# Patient Record
Sex: FEMALE | Race: WHITE | NOT HISPANIC OR LATINO | Employment: OTHER | URBAN - METROPOLITAN AREA
[De-identification: names, ages, dates, MRNs, and addresses within clinical notes are randomized per-mention and may not be internally consistent; named-entity substitution may affect disease eponyms.]

---

## 2023-03-28 LAB
APPEARANCE, URINE: CLEAR
BILIRUBIN, URINE: NEGATIVE
BLOOD, URINE: NEGATIVE
COLOR, URINE: ABNORMAL
GLUCOSE, URINE: NEGATIVE MG/DL
KETONES, URINE: NEGATIVE MG/DL
LEUKOCYTE ESTERASE, URINE: ABNORMAL
MUCUS, URINE: NORMAL /LPF
NITRITE, URINE: NEGATIVE
PH, URINE: 6 (ref 5–8)
PROTEIN, URINE: NEGATIVE MG/DL
RBC, URINE: 1 /HPF (ref 0–5)
SPECIFIC GRAVITY, URINE: 1.01 (ref 1–1.03)
SQUAMOUS EPITHELIAL CELLS, URINE: <1 /HPF
UROBILINOGEN, URINE: <2 MG/DL (ref 0–1.9)
WBC, URINE: 4 /HPF (ref 0–5)

## 2023-03-29 LAB — URINE CULTURE: NORMAL

## 2023-04-09 LAB — URINE CULTURE: ABNORMAL

## 2023-04-18 LAB — URINE CULTURE: NORMAL

## 2023-04-21 PROBLEM — M54.2 NECK PAIN ON LEFT SIDE: Status: ACTIVE | Noted: 2023-04-21

## 2023-04-21 PROBLEM — K21.9 GERD (GASTROESOPHAGEAL REFLUX DISEASE): Status: ACTIVE | Noted: 2023-04-21

## 2023-04-21 PROBLEM — E55.9 VITAMIN D DEFICIENCY: Status: ACTIVE | Noted: 2023-04-21

## 2023-04-21 PROBLEM — N39.3 FEMALE STRESS INCONTINENCE: Status: ACTIVE | Noted: 2023-04-21

## 2023-04-21 PROBLEM — M17.12 PRIMARY OSTEOARTHRITIS OF LEFT KNEE: Status: ACTIVE | Noted: 2023-04-21

## 2023-04-21 PROBLEM — H35.039 HYPERTENSIVE RETINOPATHY: Status: ACTIVE | Noted: 2023-04-21

## 2023-04-21 PROBLEM — N95.1 MENOPAUSAL SYMPTOMS: Status: ACTIVE | Noted: 2023-04-21

## 2023-04-21 PROBLEM — E78.5 HYPERLIPIDEMIA: Status: ACTIVE | Noted: 2023-04-21

## 2023-04-21 PROBLEM — M25.562 LEFT KNEE PAIN: Status: ACTIVE | Noted: 2023-04-21

## 2023-04-21 PROBLEM — H34.8312: Status: ACTIVE | Noted: 2023-04-21

## 2023-04-21 PROBLEM — M79.18 MYOFASCIAL MUSCLE PAIN: Status: ACTIVE | Noted: 2023-04-21

## 2023-04-21 PROBLEM — N39.0 UTI (URINARY TRACT INFECTION): Status: ACTIVE | Noted: 2023-04-21

## 2023-04-21 PROBLEM — E03.9 HYPOTHYROIDISM: Status: ACTIVE | Noted: 2023-04-21

## 2023-04-21 PROBLEM — I10 HYPERTENSION: Status: ACTIVE | Noted: 2023-04-21

## 2023-04-21 PROBLEM — R51.9 HEADACHE: Status: ACTIVE | Noted: 2023-04-21

## 2023-04-21 PROBLEM — M85.80 OSTEOPENIA: Status: ACTIVE | Noted: 2023-04-21

## 2023-04-21 PROBLEM — J30.2 SEASONAL ALLERGIES: Status: ACTIVE | Noted: 2023-04-21

## 2023-04-21 PROBLEM — N81.9 VAGINAL VAULT PROLAPSE: Status: ACTIVE | Noted: 2023-04-21

## 2023-04-21 PROBLEM — R73.09 ELEVATED GLUCOSE: Status: ACTIVE | Noted: 2023-04-21

## 2023-04-21 PROBLEM — R53.83 FATIGUE: Status: ACTIVE | Noted: 2023-04-21

## 2023-04-21 RX ORDER — LEVOTHYROXINE SODIUM 75 UG/1
75 TABLET ORAL DAILY
COMMUNITY
Start: 2014-02-19 | End: 2023-08-10 | Stop reason: SDUPTHER

## 2023-04-21 RX ORDER — HYDROCHLOROTHIAZIDE 12.5 MG/1
12.5 TABLET ORAL DAILY
COMMUNITY
Start: 2023-02-22 | End: 2023-04-25 | Stop reason: ALTCHOICE

## 2023-04-21 RX ORDER — LISINOPRIL 20 MG/1
20 TABLET ORAL DAILY
COMMUNITY
Start: 2022-11-03 | End: 2023-08-25

## 2023-04-21 RX ORDER — ATORVASTATIN CALCIUM 10 MG/1
10 TABLET, FILM COATED ORAL NIGHTLY
COMMUNITY
Start: 2019-12-09 | End: 2024-02-20

## 2023-04-21 RX ORDER — CETIRIZINE HYDROCHLORIDE 10 MG/1
10 TABLET ORAL DAILY
COMMUNITY
Start: 2017-05-26

## 2023-04-21 RX ORDER — CHOLECALCIFEROL (VITAMIN D3) 25 MCG
3 TABLET ORAL DAILY
COMMUNITY

## 2023-04-21 RX ORDER — AMLODIPINE BESYLATE 5 MG/1
5 TABLET ORAL DAILY
COMMUNITY
Start: 2022-11-28 | End: 2023-07-20 | Stop reason: SINTOL

## 2023-04-21 RX ORDER — PANTOPRAZOLE SODIUM 40 MG/1
40 TABLET, DELAYED RELEASE ORAL DAILY
COMMUNITY
Start: 2021-05-25 | End: 2024-02-20

## 2023-04-25 ENCOUNTER — LAB (OUTPATIENT)
Dept: LAB | Facility: LAB | Age: 73
End: 2023-04-25
Payer: MEDICARE

## 2023-04-25 ENCOUNTER — OFFICE VISIT (OUTPATIENT)
Dept: PRIMARY CARE | Facility: CLINIC | Age: 73
End: 2023-04-25
Payer: MEDICARE

## 2023-04-25 VITALS
SYSTOLIC BLOOD PRESSURE: 128 MMHG | HEART RATE: 60 BPM | RESPIRATION RATE: 14 BRPM | WEIGHT: 155.7 LBS | OXYGEN SATURATION: 97 % | TEMPERATURE: 97 F | BODY MASS INDEX: 25.94 KG/M2 | DIASTOLIC BLOOD PRESSURE: 74 MMHG | HEIGHT: 65 IN

## 2023-04-25 DIAGNOSIS — E03.9 ACQUIRED HYPOTHYROIDISM: ICD-10-CM

## 2023-04-25 DIAGNOSIS — E78.2 MIXED HYPERLIPIDEMIA: ICD-10-CM

## 2023-04-25 DIAGNOSIS — I10 PRIMARY HYPERTENSION: ICD-10-CM

## 2023-04-25 DIAGNOSIS — H35.031 HYPERTENSIVE RETINOPATHY OF RIGHT EYE: ICD-10-CM

## 2023-04-25 DIAGNOSIS — R73.09 ELEVATED GLUCOSE: ICD-10-CM

## 2023-04-25 DIAGNOSIS — I10 PRIMARY HYPERTENSION: Primary | ICD-10-CM

## 2023-04-25 PROBLEM — N81.10 FEMALE BLADDER PROLAPSE: Status: ACTIVE | Noted: 2023-04-25

## 2023-04-25 PROBLEM — R30.9 URINARY PAIN: Status: ACTIVE | Noted: 2023-04-25

## 2023-04-25 LAB
ALANINE AMINOTRANSFERASE (SGPT) (U/L) IN SER/PLAS: 19 U/L (ref 7–45)
ALBUMIN (G/DL) IN SER/PLAS: 4.6 G/DL (ref 3.4–5)
ALKALINE PHOSPHATASE (U/L) IN SER/PLAS: 89 U/L (ref 33–136)
ANION GAP IN SER/PLAS: 14 MMOL/L (ref 10–20)
ASPARTATE AMINOTRANSFERASE (SGOT) (U/L) IN SER/PLAS: 20 U/L (ref 9–39)
BILIRUBIN TOTAL (MG/DL) IN SER/PLAS: 0.6 MG/DL (ref 0–1.2)
CALCIUM (MG/DL) IN SER/PLAS: 9.4 MG/DL (ref 8.6–10.6)
CARBON DIOXIDE, TOTAL (MMOL/L) IN SER/PLAS: 27 MMOL/L (ref 21–32)
CHLORIDE (MMOL/L) IN SER/PLAS: 106 MMOL/L (ref 98–107)
CHOLESTEROL (MG/DL) IN SER/PLAS: 161 MG/DL (ref 0–199)
CHOLESTEROL IN HDL (MG/DL) IN SER/PLAS: 58 MG/DL
CHOLESTEROL/HDL RATIO: 2.8
CREATININE (MG/DL) IN SER/PLAS: 0.97 MG/DL (ref 0.5–1.05)
ESTIMATED AVERAGE GLUCOSE FOR HBA1C: 117 MG/DL
GFR FEMALE: 62 ML/MIN/1.73M2
GLUCOSE (MG/DL) IN SER/PLAS: 107 MG/DL (ref 74–99)
HEMOGLOBIN A1C/HEMOGLOBIN TOTAL IN BLOOD: 5.7 %
LDL: 83 MG/DL (ref 0–99)
POTASSIUM (MMOL/L) IN SER/PLAS: 4.6 MMOL/L (ref 3.5–5.3)
PROTEIN TOTAL: 7 G/DL (ref 6.4–8.2)
SODIUM (MMOL/L) IN SER/PLAS: 142 MMOL/L (ref 136–145)
THYROTROPIN (MIU/L) IN SER/PLAS BY DETECTION LIMIT <= 0.05 MIU/L: 2.06 MIU/L (ref 0.44–3.98)
TRIGLYCERIDE (MG/DL) IN SER/PLAS: 102 MG/DL (ref 0–149)
UREA NITROGEN (MG/DL) IN SER/PLAS: 17 MG/DL (ref 6–23)
VLDL: 20 MG/DL (ref 0–40)

## 2023-04-25 PROCEDURE — 80061 LIPID PANEL: CPT

## 2023-04-25 PROCEDURE — 1160F RVW MEDS BY RX/DR IN RCRD: CPT | Performed by: FAMILY MEDICINE

## 2023-04-25 PROCEDURE — 83036 HEMOGLOBIN GLYCOSYLATED A1C: CPT

## 2023-04-25 PROCEDURE — 1036F TOBACCO NON-USER: CPT | Performed by: FAMILY MEDICINE

## 2023-04-25 PROCEDURE — 1159F MED LIST DOCD IN RCRD: CPT | Performed by: FAMILY MEDICINE

## 2023-04-25 PROCEDURE — 36415 COLL VENOUS BLD VENIPUNCTURE: CPT

## 2023-04-25 PROCEDURE — 80053 COMPREHEN METABOLIC PANEL: CPT

## 2023-04-25 PROCEDURE — 99214 OFFICE O/P EST MOD 30 MIN: CPT | Performed by: FAMILY MEDICINE

## 2023-04-25 PROCEDURE — 3078F DIAST BP <80 MM HG: CPT | Performed by: FAMILY MEDICINE

## 2023-04-25 PROCEDURE — 84443 ASSAY THYROID STIM HORMONE: CPT

## 2023-04-25 PROCEDURE — 3074F SYST BP LT 130 MM HG: CPT | Performed by: FAMILY MEDICINE

## 2023-04-25 RX ORDER — PHENAZOPYRIDINE HYDROCHLORIDE 200 MG/1
TABLET, FILM COATED ORAL
COMMUNITY
Start: 2023-04-17 | End: 2023-04-25 | Stop reason: ALTCHOICE

## 2023-04-25 RX ORDER — NITROFURANTOIN 25; 75 MG/1; MG/1
1 CAPSULE ORAL 2 TIMES DAILY
COMMUNITY
Start: 2023-04-10 | End: 2023-04-25 | Stop reason: ALTCHOICE

## 2023-04-25 ASSESSMENT — PAIN SCALES - GENERAL: PAINLEVEL: 0-NO PAIN

## 2023-04-25 NOTE — PROGRESS NOTES
"Subjective   Patient ID: Eula Ku is a 72 y.o. female who presents for Hypothyroidism and Hyperlipidemia.    HPI   Eula was seen today for routine follow-up of her hypertension, hyperlipidemia, hypothyroidism.  Medication(s) are being taken and tolerated as prescribed, without concerns, list reconciled today.  There are no complaints of chest pain, shortness of breath, lower extremity edema, or exertional concerns    She overall feels well, has no new concerns today.  She is about 7 weeks status post rectal prolapse/bladder surgery.  He is labs reviewed, fasting sugar was 106, A1c 5.8.  Since then, prior to her surgery in , she cut out candy and other sweets, walks 2 to 3 miles a day.  Weight was down, then she gained a few postoperatively.  Review of Systems  The full, 10+ multi-organ review of systems, is within normal limits with the exception of what is noted above in HPI.  Objective   /74 (BP Location: Right arm, Patient Position: Sitting, BP Cuff Size: Adult)   Pulse 60   Temp 36.1 °C (97 °F) (Temporal)   Resp 14   Ht 1.651 m (5' 5\")   Wt 70.6 kg (155 lb 11.2 oz)   LMP  (LMP Unknown)   SpO2 97%   BMI 25.91 kg/m²     Physical Exam  Constitutional/General appearance: alert, oriented, well-appearing, in no distress  Head and face exam is normal  No scleral icterus or conjunctival erythema present  Hearing is grossly normal  Respiratory effort is normal, no dyspnea noted  Cortical function is normal  Mood, affect, are pleasant, appropriate, and interactive.  Insight is normal  Cardiac exam reveals a regular rate and rhythm, subtle   Assessment/Plan     Hypertension--- since today's blood pressures are at goal, I have recommended continuing the current treatment regimen, including medication as noted above, as well as a low salt, low-fat, high-fiber diet.  Exercise is to be continued as able and tolerated.  We will continue to follow the high blood pressure on an every six-month basis, " and address additional needs should they arise.    Hyperlipidemia--- since lipid panels are/have been stable, I have recommended continuing the current regimen.  This includes a low fat/high-fiber diet, to include foods rich in natural Omega-3's, such as seafood, nuts, and olives, so long as allergies do not prohibit.  Exercise should be continued as able.  Refills were sent as needed.  We will continue followup on an every six-month basis, and will address further needs/issues should they arise.    Hypothyroidism, clinically stable-----laboratory studies will be followed, as ordered/discussed.  The current regimen will be continued, including medication as noted above.  Refills will be appropriately maintained, and I have recommended continuing follow-up on an every 6 month basis, or sooner should the need arise.  Activity as tolerated, and a healthy diet are encouraged.    History of mildly elevated glucose, we will check a fasting sugar, as well as A1c today.    GERD--- symptoms remain well controlled on the current therapy, which I have recommended be continued.  No worrisome features are currently present.  Reflux precautions are important, including following a low fat/spice/caffeine/alcohol diet, and minimizing NSAIDs and aspirin.  Weight maintenance/loss measures will also be helpful.    Routine f/u will be continued.      **Portions of this medical record have been created using voice recognition software and may have minor errors which are inherent in voice recognition systems. It has not been fully edited for typographical or grammatical errors**

## 2023-04-26 NOTE — RESULT ENCOUNTER NOTE
Labs are all reassuring and stable.  Sugar is a few pts above normal, chol, kidney, liver, sugar, thyroid levels are normal.  Continue current medications, keep routine follow up

## 2023-06-16 LAB — URINE CULTURE: ABNORMAL

## 2023-07-18 ENCOUNTER — TELEPHONE (OUTPATIENT)
Dept: PRIMARY CARE | Facility: CLINIC | Age: 73
End: 2023-07-18
Payer: MEDICARE

## 2023-07-18 NOTE — TELEPHONE ENCOUNTER
The patient is calling this afternoon with concerns to side effects to her BP medication.    The patient states she was told swelling would be a side effect, and while she does not use salt and walks religiously she has a lot of swelling in her ankles and legs.  The patient is asking if she needs a water pill or what would be suggested by the provider?

## 2023-07-20 DIAGNOSIS — I10 PRIMARY HYPERTENSION: Primary | ICD-10-CM

## 2023-07-20 RX ORDER — METOPROLOL SUCCINATE 50 MG/1
50 TABLET, EXTENDED RELEASE ORAL DAILY
Qty: 30 TABLET | Refills: 1 | Status: SHIPPED | OUTPATIENT
Start: 2023-07-20 | End: 2023-08-14

## 2023-07-20 RX ORDER — HYDROCHLOROTHIAZIDE 12.5 MG/1
12.5 TABLET ORAL DAILY
Qty: 90 TABLET | Refills: 1 | Status: SHIPPED | OUTPATIENT
Start: 2023-07-20 | End: 2023-07-20 | Stop reason: SINTOL

## 2023-07-20 NOTE — TELEPHONE ENCOUNTER
Yes, the swelling is very likely from her amlodipine.  Please stop it, side effects should improve over the next 2 weeks or so.  She will need something to replace the amlodipine, to keep her blood pressure at goal, so I did send a prescription for a mild diuretic, hydrochlorothiazide, once daily in the morning.  Track blood pressures at home if able.

## 2023-07-20 NOTE — TELEPHONE ENCOUNTER
Yes, now I recall.  I did put it on her allergy/sensitivity list now, so there is no question in the future.  Please cancel the hydrochlorothiazide at her pharmacy, I sent a prescription for metoprolol once daily

## 2023-08-10 DIAGNOSIS — E03.9 HYPOTHYROIDISM, UNSPECIFIED TYPE: ICD-10-CM

## 2023-08-11 RX ORDER — LEVOTHYROXINE SODIUM 75 UG/1
75 TABLET ORAL DAILY
Qty: 90 TABLET | Refills: 1 | Status: SHIPPED | OUTPATIENT
Start: 2023-08-11 | End: 2023-10-26 | Stop reason: ALTCHOICE

## 2023-08-13 DIAGNOSIS — I10 PRIMARY HYPERTENSION: ICD-10-CM

## 2023-08-14 RX ORDER — METOPROLOL SUCCINATE 50 MG/1
50 TABLET, EXTENDED RELEASE ORAL DAILY
Qty: 90 TABLET | Refills: 3 | Status: SHIPPED | OUTPATIENT
Start: 2023-08-14 | End: 2023-10-23

## 2023-08-25 ENCOUNTER — TELEPHONE (OUTPATIENT)
Dept: PRIMARY CARE | Facility: CLINIC | Age: 73
End: 2023-08-25

## 2023-08-25 DIAGNOSIS — I10 PRIMARY HYPERTENSION: Primary | ICD-10-CM

## 2023-08-25 RX ORDER — LISINOPRIL 40 MG/1
40 TABLET ORAL DAILY
Qty: 90 TABLET | Refills: 3 | Status: SHIPPED | OUTPATIENT
Start: 2023-08-25 | End: 2023-10-27 | Stop reason: SDUPTHER

## 2023-08-25 NOTE — TELEPHONE ENCOUNTER
Continue same metoprolol, increase lisinopril to 40 mg daily, new prescription sent.  She may use up her 20 mg tablets by taking 2 daily at the same time.

## 2023-10-20 DIAGNOSIS — I10 PRIMARY HYPERTENSION: ICD-10-CM

## 2023-10-20 NOTE — TELEPHONE ENCOUNTER
Pt calling said her Lisinopril was upped in Sept and she was doing good but the last 4 days her bp has been running 179/90 and work her up with headache , some visual disturbance, she get occ migraines.  10/19 bp 178/22  10/18 bp 179/89   10/17 bp 176/80  Pt has apt with you next Thursday,I advised her I spoke with you and you were having her take a extra Metoprolol today and 2 tabs daily till apt next week.  If worsening symptoms or no better needs to go to er.

## 2023-10-23 ENCOUNTER — OFFICE VISIT (OUTPATIENT)
Dept: PRIMARY CARE | Facility: CLINIC | Age: 73
End: 2023-10-23
Payer: MEDICARE

## 2023-10-23 VITALS
SYSTOLIC BLOOD PRESSURE: 170 MMHG | WEIGHT: 151.8 LBS | BODY MASS INDEX: 24.5 KG/M2 | HEART RATE: 52 BPM | RESPIRATION RATE: 14 BRPM | OXYGEN SATURATION: 98 % | DIASTOLIC BLOOD PRESSURE: 80 MMHG | TEMPERATURE: 97 F

## 2023-10-23 DIAGNOSIS — R51.9 NONINTRACTABLE HEADACHE, UNSPECIFIED CHRONICITY PATTERN, UNSPECIFIED HEADACHE TYPE: ICD-10-CM

## 2023-10-23 DIAGNOSIS — E03.9 HYPOTHYROIDISM, UNSPECIFIED TYPE: ICD-10-CM

## 2023-10-23 DIAGNOSIS — R73.09 ELEVATED GLUCOSE: ICD-10-CM

## 2023-10-23 DIAGNOSIS — I10 UNCONTROLLED STAGE 2 HYPERTENSION: Primary | ICD-10-CM

## 2023-10-23 DIAGNOSIS — I10 HYPERTENSION, UNSPECIFIED TYPE: ICD-10-CM

## 2023-10-23 PROCEDURE — 1160F RVW MEDS BY RX/DR IN RCRD: CPT | Performed by: FAMILY MEDICINE

## 2023-10-23 PROCEDURE — 1126F AMNT PAIN NOTED NONE PRSNT: CPT | Performed by: FAMILY MEDICINE

## 2023-10-23 PROCEDURE — 1036F TOBACCO NON-USER: CPT | Performed by: FAMILY MEDICINE

## 2023-10-23 PROCEDURE — 3079F DIAST BP 80-89 MM HG: CPT | Performed by: FAMILY MEDICINE

## 2023-10-23 PROCEDURE — 99214 OFFICE O/P EST MOD 30 MIN: CPT | Performed by: FAMILY MEDICINE

## 2023-10-23 PROCEDURE — 3077F SYST BP >= 140 MM HG: CPT | Performed by: FAMILY MEDICINE

## 2023-10-23 PROCEDURE — 1159F MED LIST DOCD IN RCRD: CPT | Performed by: FAMILY MEDICINE

## 2023-10-23 RX ORDER — AMLODIPINE BESYLATE 10 MG/1
10 TABLET ORAL DAILY
Qty: 30 TABLET | Refills: 0 | Status: SHIPPED | OUTPATIENT
Start: 2023-10-23 | End: 2023-11-02 | Stop reason: SDUPTHER

## 2023-10-23 NOTE — PROGRESS NOTES
Subjective   Patient ID: Eula Ku is a 73 y.o. female who presents for Hospital Follow-up (Pt presents for ED follow up HTN- pt states that she is not feeling well, states that she was up all night, gives her a headache, not getting any better at all. Pt has been keeping track of her bp).    HPI     Patient of Juan Antonio Mclain MD    Patient here for follow-up HTN s/p ED evaluation.    Patient presented to [facility] ED on 10/20/2023 with headaches and high blood pressure.     ED work-up:  -EKG shows rate of 54, QTc 421, no STEMI or acute ischemic change   -CT interpretation showed no acute intracranial abnormality  -Troponins negative  -No JUSTO  -No endorgan damage from hypertension  -Provided the patient with headache cocktail.   -BP much improved 130/72 after migraine cocktail.   -Patient discharged from ED to follow-up with PCP.    Differential Diagnoses per ED Note  -Hypertensive emergency  -High blood pressure  -Subarachnoid hemorrhage,   -ACS   -Other acute sequela of hypertension    Today, 10/23/2023, patient reports that she is not feeling well.   She was up all night, having headaches.  Can tell when pressures are up because she is symptomatic.  She states she is not having migraines, which she does have history of, feels these area different type of headaches that come as a result of uncontrolled pressures.    BP is 170/80 upon rooming in office today.    She brought home BP log into today.    Historically, this reveals that the Amlodipine controlled her pressures well but this caused edema. No rash or pain associated with the swelling, just noticeable. This was discontinued in August 2023 due to the edema and her Lisinopril was increased.    She notes that her readings from after increasing the Lisinopril do not indicate this increase was effective prior to having these significantly elevated pressures.     She is having symptomatic hypertension since these recent medications, reports headaches and  "states \"can just feel pressures\".    Denies any new supplements, dietary changes, or other medications changes other than the Amlodipine.    Current regimen includes:  Metoprolol 50 mg daily  Lisinopril 40 mg daily    Prior medications:  Amlodipine, unable to tolerate due to BLE edema.  HCTZ, unable to tolerate due to hyponatremia.    Review of Systems   All other systems reviewed and are negative.      Objective   /80 (BP Location: Right arm, Patient Position: Sitting, BP Cuff Size: Small adult)   Pulse 52   Temp 36.1 °C (97 °F) (Temporal)   Resp 14   Wt 68.9 kg (151 lb 12.8 oz)   LMP  (LMP Unknown)   SpO2 98%   BMI 24.50 kg/m²     Physical Exam  Vitals and nursing note reviewed.   Constitutional:       General: She is not in acute distress.     Appearance: Normal appearance. She is not toxic-appearing.   HENT:      Head: Normocephalic and atraumatic.   Eyes:      Extraocular Movements: Extraocular movements intact.      Pupils: Pupils are equal, round, and reactive to light.   Neck:      Thyroid: No thyromegaly.      Vascular: No carotid bruit, hepatojugular reflux or JVD.   Cardiovascular:      Rate and Rhythm: Regular rhythm. Bradycardia present.      Heart sounds: No murmur heard.     No friction rub. No gallop.   Pulmonary:      Effort: Pulmonary effort is normal.      Breath sounds: Normal breath sounds. No wheezing, rhonchi or rales.   Musculoskeletal:      Right lower leg: Edema (trace) present.      Left lower leg: Edema (trace) present.   Lymphadenopathy:      Cervical: No cervical adenopathy.   Neurological:      General: No focal deficit present.      Mental Status: She is alert and oriented to person, place, and time.   Psychiatric:         Mood and Affect: Mood normal.         Behavior: Behavior normal.       Assessment/Plan   Diagnoses and all orders for this visit:  Uncontrolled stage 2 hypertension  Hypertension, unspecified type  Nonintractable headache, unspecified chronicity " pattern, unspecified headache type  Hypothyroidism, unspecified type  Elevated glucose    ED records reviewed and discussed with patient, see HPI for summary.  -EKG shows rate of 54, QTc 421, no STEMI or acute ischemic change   -CT interpretation showed no acute intracranial abnormality  -Troponins negative  -No JUSTO  -No endorgan damage from hypertension  -BP much improved 130/72 after migraine cocktail.     Patient continues with symptomatic uncontrolled hypertension outpatient. BP home log reviewed with patient today, see medication changes below.    #1 Discontinue Metoprolol 50 mg  BP diary reveals this is ineffective and patients HR in 50s at low dose.    HCTZ contraindicated secondary to hyponatremia.  Amlodipine previously effective but did have edema with this.    We discussed resuming the Amlodipine vs trial a second or third line agent. Since patient symptomatic, BP significantly elevated, and this was effective in past (per patient report and BP diary) patient elects to resume the Amlodipine and revisit other options once BP is under better control. Her primary concern at this time is severity in elevation and associated headaches which impact her sleep, cognition, etc.    #2 Resume Amlodipine 10 mg daily  Patient to watch for edema.  Compression stockings (the kind that goes up to your knees) recommended. I recommend 10-15 or 15-20 mmHg stockings, which can be purchased on [employer].    #3 Continue Lisinopril 40 mg daily.    If patient wishes to try a natural approach to lowering blood pressure, can consider, patient advised these will not bring down to goal from current pressures but can help in addition to antihypertensives:  -whey protein 20 -30 g daily (hydrolyzed is best, but any is ok)  -aged garlic 600 mg twice daily ([employer] brand aged garlic on line is one example)   -CoQ10 supplement at 120 mg - 360 mg daily (average dose studied 225mg daily).     Patient to follow-up with PCP for further discussion  of BP meds, they can further explore other possible options at that time.    Repeat BMP, A1c (hx of hyperglycemia), and TSH w/ reflex (hx of hypothyroidism) ordered today, she should have these done prior to her follow-up with Dr. Mclain.      Follow-up with Dr. Mclain as previously scheduled for routine care.  Call for sooner follow-up if needed.     Scribe Attestation  By signing my name below, IAye Scribe   attest that this documentation has been prepared under the direction and in the presence of Aline Arndt DO.

## 2023-10-24 ENCOUNTER — LAB (OUTPATIENT)
Dept: LAB | Facility: LAB | Age: 73
End: 2023-10-24
Payer: MEDICARE

## 2023-10-24 DIAGNOSIS — E78.2 MIXED HYPERLIPIDEMIA: ICD-10-CM

## 2023-10-24 DIAGNOSIS — E03.9 ACQUIRED HYPOTHYROIDISM: ICD-10-CM

## 2023-10-24 DIAGNOSIS — E03.9 HYPOTHYROIDISM, UNSPECIFIED TYPE: ICD-10-CM

## 2023-10-24 DIAGNOSIS — I10 PRIMARY HYPERTENSION: ICD-10-CM

## 2023-10-24 DIAGNOSIS — I10 HYPERTENSION, UNSPECIFIED TYPE: ICD-10-CM

## 2023-10-24 DIAGNOSIS — E87.1 HYPONATREMIA: ICD-10-CM

## 2023-10-24 DIAGNOSIS — H35.031 HYPERTENSIVE RETINOPATHY OF RIGHT EYE: ICD-10-CM

## 2023-10-24 DIAGNOSIS — R73.09 ELEVATED GLUCOSE: ICD-10-CM

## 2023-10-24 LAB
ANION GAP SERPL CALC-SCNC: 13 MMOL/L
BUN SERPL-MCNC: 12 MG/DL (ref 6–23)
CALCIUM SERPL-MCNC: 9.7 MG/DL (ref 8.6–10.6)
CHLORIDE SERPL-SCNC: 93 MMOL/L (ref 98–107)
CO2 SERPL-SCNC: 27 MMOL/L (ref 21–32)
CREAT SERPL-MCNC: 0.94 MG/DL (ref 0.5–1.05)
EST. AVERAGE GLUCOSE BLD GHB EST-MCNC: 114 MG/DL
GFR SERPL CREATININE-BSD FRML MDRD: 65 ML/MIN/1.73M*2
GLUCOSE SERPL-MCNC: 117 MG/DL (ref 74–99)
HBA1C MFR BLD: 5.6 %
POTASSIUM SERPL-SCNC: 4.2 MMOL/L (ref 3.5–5.3)
SODIUM SERPL-SCNC: 129 MMOL/L (ref 136–145)
T4 FREE SERPL-MCNC: 1.32 NG/DL (ref 0.78–1.48)
TSH SERPL-ACNC: 9.39 MIU/L (ref 0.44–3.98)

## 2023-10-24 PROCEDURE — 84443 ASSAY THYROID STIM HORMONE: CPT

## 2023-10-24 PROCEDURE — 83036 HEMOGLOBIN GLYCOSYLATED A1C: CPT

## 2023-10-24 PROCEDURE — 83930 ASSAY OF BLOOD OSMOLALITY: CPT

## 2023-10-24 PROCEDURE — 36415 COLL VENOUS BLD VENIPUNCTURE: CPT

## 2023-10-24 PROCEDURE — 80048 BASIC METABOLIC PNL TOTAL CA: CPT

## 2023-10-24 PROCEDURE — 84439 ASSAY OF FREE THYROXINE: CPT

## 2023-10-26 ENCOUNTER — OFFICE VISIT (OUTPATIENT)
Dept: PRIMARY CARE | Facility: CLINIC | Age: 73
End: 2023-10-26
Payer: MEDICARE

## 2023-10-26 ENCOUNTER — LAB (OUTPATIENT)
Dept: LAB | Facility: LAB | Age: 73
End: 2023-10-26
Payer: MEDICARE

## 2023-10-26 VITALS
RESPIRATION RATE: 16 BRPM | BODY MASS INDEX: 24 KG/M2 | OXYGEN SATURATION: 98 % | TEMPERATURE: 96.8 F | HEART RATE: 84 BPM | WEIGHT: 148.7 LBS | SYSTOLIC BLOOD PRESSURE: 103 MMHG | DIASTOLIC BLOOD PRESSURE: 68 MMHG

## 2023-10-26 DIAGNOSIS — Z00.00 MEDICARE ANNUAL WELLNESS VISIT, SUBSEQUENT: ICD-10-CM

## 2023-10-26 DIAGNOSIS — E87.1 HYPONATREMIA: ICD-10-CM

## 2023-10-26 DIAGNOSIS — E78.2 MIXED HYPERLIPIDEMIA: ICD-10-CM

## 2023-10-26 DIAGNOSIS — H35.031 HYPERTENSIVE RETINOPATHY OF RIGHT EYE: ICD-10-CM

## 2023-10-26 DIAGNOSIS — E03.9 ACQUIRED HYPOTHYROIDISM: ICD-10-CM

## 2023-10-26 DIAGNOSIS — I10 PRIMARY HYPERTENSION: ICD-10-CM

## 2023-10-26 DIAGNOSIS — E87.1 HYPONATREMIA: Primary | ICD-10-CM

## 2023-10-26 PROBLEM — H25.13 AGE-RELATED NUCLEAR CATARACT OF BOTH EYES: Status: ACTIVE | Noted: 2022-05-13

## 2023-10-26 PROBLEM — H34.8310 TRIBUTARY (BRANCH) RETINAL VEIN OCCLUSION, RIGHT EYE, WITH MACULAR EDEMA (CMS-HCC): Status: ACTIVE | Noted: 2022-05-13

## 2023-10-26 PROBLEM — D25.9 LEIOMYOMA OF UTERUS, UNSPECIFIED: Status: ACTIVE | Noted: 2023-10-26

## 2023-10-26 PROBLEM — N81.6 RECTOCELE: Status: ACTIVE | Noted: 2023-10-26

## 2023-10-26 PROBLEM — R35.0 URINARY FREQUENCY: Status: ACTIVE | Noted: 2023-10-26

## 2023-10-26 PROBLEM — Z90.710 H/O HYSTERECTOMY FOR BENIGN DISEASE: Status: ACTIVE | Noted: 2023-10-26

## 2023-10-26 PROBLEM — G89.29 CHRONIC BILATERAL LOW BACK PAIN WITH RIGHT-SIDED SCIATICA: Status: ACTIVE | Noted: 2017-03-31

## 2023-10-26 PROBLEM — H81.09 MENIERE DISEASE: Status: ACTIVE | Noted: 2023-10-26

## 2023-10-26 PROBLEM — M54.41 CHRONIC BILATERAL LOW BACK PAIN WITH RIGHT-SIDED SCIATICA: Status: ACTIVE | Noted: 2017-03-31

## 2023-10-26 PROBLEM — N92.0 EXCESSIVE OR FREQUENT MENSTRUATION: Status: ACTIVE | Noted: 2023-10-26

## 2023-10-26 PROBLEM — H04.123 DRY EYE SYNDROME OF BOTH EYES: Status: ACTIVE | Noted: 2022-05-13

## 2023-10-26 PROBLEM — M12.9 ARTHROPATHY: Status: ACTIVE | Noted: 2023-10-26

## 2023-10-26 PROBLEM — K22.70 BARRETT'S ESOPHAGUS WITHOUT DYSPLASIA: Status: ACTIVE | Noted: 2021-07-11

## 2023-10-26 PROBLEM — H52.7 REFRACTIVE ERROR: Status: ACTIVE | Noted: 2022-05-13

## 2023-10-26 LAB — OSMOLALITY SERPL: 273 MOSM/KG (ref 280–300)

## 2023-10-26 PROCEDURE — 1126F AMNT PAIN NOTED NONE PRSNT: CPT | Performed by: FAMILY MEDICINE

## 2023-10-26 PROCEDURE — 3078F DIAST BP <80 MM HG: CPT | Performed by: FAMILY MEDICINE

## 2023-10-26 PROCEDURE — 83935 ASSAY OF URINE OSMOLALITY: CPT

## 2023-10-26 PROCEDURE — G0439 PPPS, SUBSEQ VISIT: HCPCS | Performed by: FAMILY MEDICINE

## 2023-10-26 PROCEDURE — 3074F SYST BP LT 130 MM HG: CPT | Performed by: FAMILY MEDICINE

## 2023-10-26 PROCEDURE — 1160F RVW MEDS BY RX/DR IN RCRD: CPT | Performed by: FAMILY MEDICINE

## 2023-10-26 PROCEDURE — 1170F FXNL STATUS ASSESSED: CPT | Performed by: FAMILY MEDICINE

## 2023-10-26 PROCEDURE — 82570 ASSAY OF URINE CREATININE: CPT

## 2023-10-26 PROCEDURE — 99214 OFFICE O/P EST MOD 30 MIN: CPT | Performed by: FAMILY MEDICINE

## 2023-10-26 PROCEDURE — 1036F TOBACCO NON-USER: CPT | Performed by: FAMILY MEDICINE

## 2023-10-26 PROCEDURE — 1159F MED LIST DOCD IN RCRD: CPT | Performed by: FAMILY MEDICINE

## 2023-10-26 PROCEDURE — 84300 ASSAY OF URINE SODIUM: CPT

## 2023-10-26 RX ORDER — LEVOTHYROXINE SODIUM 88 UG/1
88 TABLET ORAL DAILY
Qty: 90 TABLET | Refills: 3 | Status: SHIPPED | OUTPATIENT
Start: 2023-10-26 | End: 2024-05-23 | Stop reason: SDUPTHER

## 2023-10-26 ASSESSMENT — ACTIVITIES OF DAILY LIVING (ADL)
DRESSING: INDEPENDENT
MANAGING_FINANCES: INDEPENDENT
GROCERY_SHOPPING: INDEPENDENT
DOING_HOUSEWORK: INDEPENDENT
BATHING: INDEPENDENT
TAKING_MEDICATION: INDEPENDENT

## 2023-10-26 ASSESSMENT — PAIN SCALES - GENERAL: PAINLEVEL: 0-NO PAIN

## 2023-10-26 ASSESSMENT — PATIENT HEALTH QUESTIONNAIRE - PHQ9
2. FEELING DOWN, DEPRESSED OR HOPELESS: NOT AT ALL
SUM OF ALL RESPONSES TO PHQ9 QUESTIONS 1 AND 2: 0
1. LITTLE INTEREST OR PLEASURE IN DOING THINGS: NOT AT ALL

## 2023-10-26 NOTE — PROGRESS NOTES
Subjective   Reason for Visit: Eula Ku is an 73 y.o. female here for a Medicare Wellness visit.          Reviewed all medications by prescribing practitioner or clinical pharmacist (such as prescriptions, OTCs, herbal therapies and supplements) and documented in the medical record.    HPI    Patient Care Team:  Juan Antonio Mclain MD as PCP - General  Juan Antonio Mclain MD as PCP - [employer] PCP     Review of Systems    Objective   Vitals:  LMP  (LMP Unknown)       Physical Exam    Assessment/Plan   Problem List Items Addressed This Visit       Hyperlipidemia    Hypertension    Hypertensive retinopathy    Hypothyroidism

## 2023-10-26 NOTE — PROGRESS NOTES
Subjective   Patient ID: Eula Ku is a 73 y.o. female who presents for Medicare Annual Wellness Visit Subsequent.    [facility]   Eula was seen today for a routine follow-up of her medications, as well as an annual Medicare wellness review.  She overall feels fairly well now, has struggled a bit in recent weeks with fairly elevated blood pressures, at one point requiring an emergency room visit.  At her last visit her Norvasc was discontinued because of bilateral lower extremity edema, replaced with metoprolol.  Blood pressures increased to the 140s, 150s, at x160s systolic.  Recent labs have been reassuring, she requested to go back on Norvasc, continue lisinopril, at least knowing that it worked well.  She is otherwise remotely been on hydrochlorothiazide, though had hyponatremia.  She is taking and tolerating her other medications well.  There are no complaints of chest pain, shortness of breath, lower extremity edema, or exertional concerns  Mammogram, colonoscopy, bone density year sufficiently up-to-date.  She has had a flu vaccine.  Pneumococcal and Shingrix vaccine series complete.    Review of Systems  The full, 10+ multi-organ review of systems, is within normal limits with the exception of what is noted above in HPI.  Objective   /68 (BP Location: Right arm, Patient Position: Sitting, BP Cuff Size: Adult)   Pulse 84   Temp 36 °C (96.8 °F) (Temporal)   Resp 16   Wt 67.4 kg (148 lb 11.2 oz)   LMP  (LMP Unknown)   SpO2 98%   BMI 24.00 kg/m²     Physical Exam  Constitutional/General appearance: alert, oriented, well-appearing, in no distress  Head and face exam is normal  No scleral icterus or conjunctival erythema present  Hearing is grossly normal  Respiratory effort is normal, no dyspnea noted  Cortical function is normal  Mood, affect, are pleasant, appropriate, and interactive.  Insight is normal  Cardiac exam reveals a regular rate and rhythm, no murmurs, rubs or gallops present.    Lungs are clear bilaterally.    No lower extremity edema present.    Assessment/Plan     Blood pressure seems to have normalized, continue current regimen.  Watch for edema from amlodipine, which could recur.  Check labs as ordered.  She does have a history of intermittent hyponatremia, we will also add urine studies.  Hypothyroidism, adjust Synthroid as discussed.  Reflux stable with pantoprazole  See HPI for Medicare gaps.    Follow-up in 6 months  **Portions of this medical record have been created using voice recognition software and may have minor errors which are inherent in voice recognition systems. It has not been fully edited for typographical or grammatical errors**

## 2023-10-27 LAB
CREAT UR-MCNC: 134.5 MG/DL (ref 20–320)
OSMOLALITY UR: 517 MOSM/KG (ref 200–1200)
SODIUM UR-SCNC: 12 MMOL/L
SODIUM/CREAT UR-RTO: 9 MMOL/G CREAT

## 2023-10-27 RX ORDER — LISINOPRIL 40 MG/1
40 TABLET ORAL DAILY
Qty: 90 TABLET | Refills: 3 | Status: SHIPPED | OUTPATIENT
Start: 2023-10-27 | End: 2024-05-23 | Stop reason: SDUPTHER

## 2023-11-02 DIAGNOSIS — I10 UNCONTROLLED STAGE 2 HYPERTENSION: ICD-10-CM

## 2023-11-02 RX ORDER — AMLODIPINE BESYLATE 10 MG/1
10 TABLET ORAL DAILY
Qty: 90 TABLET | Refills: 3 | Status: SHIPPED | OUTPATIENT
Start: 2023-11-02 | End: 2024-05-08 | Stop reason: SINTOL

## 2023-11-03 ENCOUNTER — TELEPHONE (OUTPATIENT)
Dept: PRIMARY CARE | Facility: CLINIC | Age: 73
End: 2023-11-03

## 2023-11-03 DIAGNOSIS — E28.39 ESTROGEN DEFICIENCY: Primary | ICD-10-CM

## 2023-11-03 DIAGNOSIS — Z12.31 ENCOUNTER FOR SCREENING MAMMOGRAM FOR MALIGNANT NEOPLASM OF BREAST: ICD-10-CM

## 2023-11-03 NOTE — TELEPHONE ENCOUNTER
Pt called for orders . Pt was seen on 10/26/23. She needs a mammogram and bone density order. Pt gets these done at .

## 2023-11-13 ENCOUNTER — APPOINTMENT (OUTPATIENT)
Dept: OBSTETRICS AND GYNECOLOGY | Facility: CLINIC | Age: 73
End: 2023-11-13
Payer: MEDICARE

## 2024-01-25 ENCOUNTER — APPOINTMENT (OUTPATIENT)
Dept: RADIOLOGY | Facility: CLINIC | Age: 74
End: 2024-01-25
Payer: MEDICARE

## 2024-01-25 ENCOUNTER — HOSPITAL ENCOUNTER (OUTPATIENT)
Dept: RADIOLOGY | Facility: CLINIC | Age: 74
Discharge: HOME | End: 2024-01-25
Payer: MEDICARE

## 2024-01-25 VITALS — WEIGHT: 148.59 LBS | HEIGHT: 66 IN | BODY MASS INDEX: 23.88 KG/M2

## 2024-01-25 DIAGNOSIS — Z12.31 ENCOUNTER FOR SCREENING MAMMOGRAM FOR MALIGNANT NEOPLASM OF BREAST: ICD-10-CM

## 2024-01-25 PROCEDURE — 77067 SCR MAMMO BI INCL CAD: CPT | Performed by: RADIOLOGY

## 2024-01-25 PROCEDURE — 77063 BREAST TOMOSYNTHESIS BI: CPT | Performed by: RADIOLOGY

## 2024-01-25 PROCEDURE — 77067 SCR MAMMO BI INCL CAD: CPT

## 2024-02-08 ENCOUNTER — HOSPITAL ENCOUNTER (OUTPATIENT)
Dept: RADIOLOGY | Facility: EXTERNAL LOCATION | Age: 74
Discharge: HOME | End: 2024-02-08

## 2024-02-20 DIAGNOSIS — E78.2 MIXED HYPERLIPIDEMIA: Primary | ICD-10-CM

## 2024-02-20 DIAGNOSIS — K21.9 GASTROESOPHAGEAL REFLUX DISEASE WITHOUT ESOPHAGITIS: ICD-10-CM

## 2024-02-20 RX ORDER — ATORVASTATIN CALCIUM 10 MG/1
TABLET, FILM COATED ORAL
Qty: 90 TABLET | Refills: 3 | Status: SHIPPED | OUTPATIENT
Start: 2024-02-20 | End: 2024-03-11

## 2024-02-20 RX ORDER — PANTOPRAZOLE SODIUM 40 MG/1
TABLET, DELAYED RELEASE ORAL
Qty: 90 TABLET | Refills: 3 | Status: SHIPPED | OUTPATIENT
Start: 2024-02-20 | End: 2024-03-08

## 2024-03-01 ENCOUNTER — TELEPHONE (OUTPATIENT)
Dept: PRIMARY CARE | Facility: CLINIC | Age: 74
End: 2024-03-01
Payer: MEDICARE

## 2024-03-01 DIAGNOSIS — N95.1 MENOPAUSAL VAGINAL DRYNESS: Primary | ICD-10-CM

## 2024-03-01 NOTE — TELEPHONE ENCOUNTER
Patient called wanting a refill on her estrogens, conjugated, (Premarin) vaginal cream . It does not look like this is a med that you fill.  [facility]     Please advise.

## 2024-03-07 DIAGNOSIS — K21.9 GASTROESOPHAGEAL REFLUX DISEASE WITHOUT ESOPHAGITIS: ICD-10-CM

## 2024-03-08 RX ORDER — PANTOPRAZOLE SODIUM 40 MG/1
40 TABLET, DELAYED RELEASE ORAL
Qty: 90 TABLET | Refills: 1 | Status: SHIPPED | OUTPATIENT
Start: 2024-03-08 | End: 2024-05-23 | Stop reason: SDUPTHER

## 2024-03-09 DIAGNOSIS — E78.2 MIXED HYPERLIPIDEMIA: ICD-10-CM

## 2024-03-11 RX ORDER — ATORVASTATIN CALCIUM 10 MG/1
10 TABLET, FILM COATED ORAL DAILY
Qty: 90 TABLET | Refills: 3 | Status: SHIPPED | OUTPATIENT
Start: 2024-03-11 | End: 2024-05-23 | Stop reason: SDUPTHER

## 2024-03-18 ENCOUNTER — APPOINTMENT (OUTPATIENT)
Dept: RADIOLOGY | Facility: CLINIC | Age: 74
End: 2024-03-18
Payer: MEDICARE

## 2024-04-01 ENCOUNTER — APPOINTMENT (OUTPATIENT)
Dept: OBSTETRICS AND GYNECOLOGY | Facility: CLINIC | Age: 74
End: 2024-04-01
Payer: MEDICARE

## 2024-05-02 ENCOUNTER — HOSPITAL ENCOUNTER (OUTPATIENT)
Dept: RADIOLOGY | Facility: CLINIC | Age: 74
Discharge: HOME | End: 2024-05-02
Payer: MEDICARE

## 2024-05-02 DIAGNOSIS — E28.39 ESTROGEN DEFICIENCY: ICD-10-CM

## 2024-05-02 PROCEDURE — 77080 DXA BONE DENSITY AXIAL: CPT

## 2024-05-03 ENCOUNTER — APPOINTMENT (OUTPATIENT)
Dept: PRIMARY CARE | Facility: CLINIC | Age: 74
End: 2024-05-03
Payer: MEDICARE

## 2024-05-07 NOTE — RESULT ENCOUNTER NOTE
Bone density testing shows osteoporosis in the left hip, very mild thinning in the low back.  Recommend a discussion of medication to strengthen bones.    Need to continue calcium, vitamin D, weightbearing exercise.

## 2024-05-08 ENCOUNTER — OFFICE VISIT (OUTPATIENT)
Dept: PRIMARY CARE | Facility: CLINIC | Age: 74
End: 2024-05-08
Payer: MEDICARE

## 2024-05-08 VITALS
BODY MASS INDEX: 24.74 KG/M2 | DIASTOLIC BLOOD PRESSURE: 65 MMHG | WEIGHT: 153.2 LBS | HEART RATE: 73 BPM | TEMPERATURE: 98.3 F | SYSTOLIC BLOOD PRESSURE: 100 MMHG | OXYGEN SATURATION: 95 %

## 2024-05-08 DIAGNOSIS — I10 PRIMARY HYPERTENSION: Primary | ICD-10-CM

## 2024-05-08 DIAGNOSIS — R73.9 ELEVATED BLOOD SUGAR: ICD-10-CM

## 2024-05-08 DIAGNOSIS — E78.2 MIXED HYPERLIPIDEMIA: ICD-10-CM

## 2024-05-08 DIAGNOSIS — M81.0 AGE-RELATED OSTEOPOROSIS WITHOUT CURRENT PATHOLOGICAL FRACTURE: ICD-10-CM

## 2024-05-08 PROCEDURE — 99214 OFFICE O/P EST MOD 30 MIN: CPT | Performed by: FAMILY MEDICINE

## 2024-05-08 PROCEDURE — 3078F DIAST BP <80 MM HG: CPT | Performed by: FAMILY MEDICINE

## 2024-05-08 PROCEDURE — 3074F SYST BP LT 130 MM HG: CPT | Performed by: FAMILY MEDICINE

## 2024-05-08 PROCEDURE — 1036F TOBACCO NON-USER: CPT | Performed by: FAMILY MEDICINE

## 2024-05-08 PROCEDURE — 1159F MED LIST DOCD IN RCRD: CPT | Performed by: FAMILY MEDICINE

## 2024-05-08 RX ORDER — IBANDRONATE SODIUM 150 MG/1
150 TABLET, FILM COATED ORAL
Qty: 3 TABLET | Refills: 3 | Status: SHIPPED | OUTPATIENT
Start: 2024-05-08 | End: 2024-05-23 | Stop reason: SDUPTHER

## 2024-05-08 RX ORDER — TERAZOSIN 1 MG/1
1 CAPSULE ORAL NIGHTLY
Qty: 90 CAPSULE | Refills: 3 | Status: SHIPPED | OUTPATIENT
Start: 2024-05-08 | End: 2024-05-23 | Stop reason: SDUPTHER

## 2024-05-08 NOTE — PROGRESS NOTES
Subjective   Patient ID: Eula Ku is a 73 y.o. female who presents for Follow-up (6 month follow up for htn,chol, thyroid. Pt states she has no new issues to discuss today. ).    HPI   Eula was seen today for a routine follow-up of her hypertension, hyperlipidemia, hypothyroidism medications, as well as a review of her recent bone density testing which showed osteopenia in her lumbar spine, T-score of -2.6 in her left femoral neck.  Caffeine = 3/week  Alcohol = none  No tobacco  Calcium = 1200 mg daily  Vitamin D = 3000 units daily  She walks regularly from a weightbearing exercise standpoint    Home BP's 110-116/75.  However, she has had recurrence of lower extremity edema from amlodipine 10 mg.  She tried decreasing to 5 mg daily, blood pressures increased to the 140s.  Eula continues to take and tolerate lisinopril as prescribed.  Pantoprazole effectively treats her reflux symptoms.  She is due for fasting labs, had a piece of toast this morning  Review of Systems  The full, 10+ multi-organ review of systems, is within normal limits with the exception of what is noted above in HPI.  Objective   /65 (BP Location: Right arm, Patient Position: Sitting, BP Cuff Size: Adult)   Pulse 73   Temp 36.8 °C (98.3 °F) (Temporal)   Wt 69.5 kg (153 lb 3.2 oz)   LMP  (LMP Unknown)   SpO2 95%   BMI 24.74 kg/m²     Physical Exam  Constitutional/General appearance: alert, oriented, well-appearing, in no distress  Head and face exam is normal  No scleral icterus or conjunctival erythema present  Hearing is grossly normal  Respiratory effort is normal, no dyspnea noted  Cortical function is normal  Mood, affect, are pleasant, appropriate, and interactive.  Insight is normal  Cardiac exam reveals a regular rate and rhythm, no murmurs, rubs or gallops present.   Lungs are clear bilaterally.    No lower extremity edema present.    Assessment/Plan     Hyperlipidemia--- since lipid panels are/have been stable, I  have recommended continuing the current regimen.  This includes a low fat/high-fiber diet, to include foods rich in natural Omega-3's, such as seafood, nuts, and olives, so long as allergies do not prohibit.  Exercise should be continued as able.  Refills were sent as needed.  We will continue followup on an every six-month basis, and will address further needs/issues should they arise.      Hypothyroidism, check TSH today, dose was increased last time.    Osteoporosis left hip, new diagnosis.  Continue lifestyle efforts, calcium and vitamin D supplementation.  She is doing everything she can with the exception of taking medication.  We discussed Boniva in detail, including side effects and expected timeline for improvement.  Prescription sent, we will plan on a bone density in 2 years, she will take no more than 5 years.    Reflux, continue pantoprazole    Hypertension, discontinue amlodipine because of lower extremity edema, continue lisinopril 40 mg daily.  She has not tolerated beta-blockers in the past, hydrochlorothiazide has caused hyponatremia, so we will try nighttime terazosin 1 mg.  She will check her blood pressures over the next couple of weeks, goal 120s or less systolic.  We may increase to 2 mg if needed, she can feel free to do this on her own, then let me know.    Follow-up in 6 months  **Portions of this medical record have been created using voice recognition software and may have minor errors which are inherent in voice recognition systems. It has not been fully edited for typographical or grammatical errors**

## 2024-05-10 ENCOUNTER — LAB (OUTPATIENT)
Dept: LAB | Facility: LAB | Age: 74
End: 2024-05-10
Payer: MEDICARE

## 2024-05-10 DIAGNOSIS — M81.0 AGE-RELATED OSTEOPOROSIS WITHOUT CURRENT PATHOLOGICAL FRACTURE: ICD-10-CM

## 2024-05-10 DIAGNOSIS — E78.2 MIXED HYPERLIPIDEMIA: ICD-10-CM

## 2024-05-10 DIAGNOSIS — I10 PRIMARY HYPERTENSION: ICD-10-CM

## 2024-05-10 DIAGNOSIS — R73.9 ELEVATED BLOOD SUGAR: ICD-10-CM

## 2024-05-10 LAB
ALBUMIN SERPL BCP-MCNC: 4.1 G/DL (ref 3.4–5)
ALP SERPL-CCNC: 75 U/L (ref 33–136)
ALT SERPL W P-5'-P-CCNC: 17 U/L (ref 7–45)
ANION GAP SERPL CALC-SCNC: 12 MMOL/L (ref 10–20)
AST SERPL W P-5'-P-CCNC: 19 U/L (ref 9–39)
BILIRUB SERPL-MCNC: 0.6 MG/DL (ref 0–1.2)
BUN SERPL-MCNC: 15 MG/DL (ref 6–23)
CALCIUM SERPL-MCNC: 9.6 MG/DL (ref 8.6–10.6)
CHLORIDE SERPL-SCNC: 105 MMOL/L (ref 98–107)
CHOLEST SERPL-MCNC: 144 MG/DL (ref 0–199)
CHOLESTEROL/HDL RATIO: 2.9
CO2 SERPL-SCNC: 27 MMOL/L (ref 21–32)
CREAT SERPL-MCNC: 1 MG/DL (ref 0.5–1.05)
EGFRCR SERPLBLD CKD-EPI 2021: 60 ML/MIN/1.73M*2
EST. AVERAGE GLUCOSE BLD GHB EST-MCNC: 114 MG/DL
GLUCOSE SERPL-MCNC: 98 MG/DL (ref 74–99)
HBA1C MFR BLD: 5.6 %
HDLC SERPL-MCNC: 49.1 MG/DL
LDLC SERPL CALC-MCNC: 76 MG/DL
NON HDL CHOLESTEROL: 95 MG/DL (ref 0–149)
PHOSPHATE SERPL-MCNC: 4.3 MG/DL (ref 2.5–4.9)
POTASSIUM SERPL-SCNC: 4.5 MMOL/L (ref 3.5–5.3)
PROT SERPL-MCNC: 6.7 G/DL (ref 6.4–8.2)
PTH-INTACT SERPL-MCNC: 35.7 PG/ML (ref 18.5–88)
SODIUM SERPL-SCNC: 139 MMOL/L (ref 136–145)
TRIGL SERPL-MCNC: 95 MG/DL (ref 0–149)
TSH SERPL-ACNC: 0.55 MIU/L (ref 0.44–3.98)
VLDL: 19 MG/DL (ref 0–40)

## 2024-05-10 PROCEDURE — 83036 HEMOGLOBIN GLYCOSYLATED A1C: CPT

## 2024-05-10 PROCEDURE — 80053 COMPREHEN METABOLIC PANEL: CPT

## 2024-05-10 PROCEDURE — 80061 LIPID PANEL: CPT

## 2024-05-10 PROCEDURE — 83970 ASSAY OF PARATHORMONE: CPT

## 2024-05-10 PROCEDURE — 36415 COLL VENOUS BLD VENIPUNCTURE: CPT

## 2024-05-10 PROCEDURE — 84443 ASSAY THYROID STIM HORMONE: CPT

## 2024-05-10 PROCEDURE — 84100 ASSAY OF PHOSPHORUS: CPT

## 2024-05-13 NOTE — RESULT ENCOUNTER NOTE
Cholesterol, sugar, kidney/liver/thyroid function are all normal.  Continue plan from recent office visit

## 2024-05-21 DIAGNOSIS — E03.9 ACQUIRED HYPOTHYROIDISM: ICD-10-CM

## 2024-05-21 DIAGNOSIS — M81.0 AGE-RELATED OSTEOPOROSIS WITHOUT CURRENT PATHOLOGICAL FRACTURE: ICD-10-CM

## 2024-05-21 DIAGNOSIS — N95.1 MENOPAUSAL VAGINAL DRYNESS: ICD-10-CM

## 2024-05-21 DIAGNOSIS — E78.2 MIXED HYPERLIPIDEMIA: ICD-10-CM

## 2024-05-21 DIAGNOSIS — I10 PRIMARY HYPERTENSION: ICD-10-CM

## 2024-05-21 DIAGNOSIS — K21.9 GASTROESOPHAGEAL REFLUX DISEASE WITHOUT ESOPHAGITIS: ICD-10-CM

## 2024-05-21 NOTE — TELEPHONE ENCOUNTER
Eula called and asked for all of her and Dima's meds be sent to [facility] in [place] from now on. They are having too many problems with mail in.

## 2024-05-22 RX ORDER — ATORVASTATIN CALCIUM 10 MG/1
10 TABLET, FILM COATED ORAL DAILY
Qty: 90 TABLET | Refills: 3 | OUTPATIENT
Start: 2024-05-22 | End: 2025-05-22

## 2024-05-22 RX ORDER — CETIRIZINE HYDROCHLORIDE 10 MG/1
10 TABLET ORAL DAILY
Qty: 90 TABLET | Refills: 0 | OUTPATIENT
Start: 2024-05-22

## 2024-05-22 RX ORDER — IBANDRONATE SODIUM 150 MG/1
150 TABLET, FILM COATED ORAL
Qty: 3 TABLET | Refills: 3 | OUTPATIENT
Start: 2024-05-22 | End: 2025-05-22

## 2024-05-22 RX ORDER — PHENOL 1.4 %
AEROSOL, SPRAY (ML) MUCOUS MEMBRANE DAILY
OUTPATIENT
Start: 2024-05-22

## 2024-05-22 RX ORDER — LEVOTHYROXINE SODIUM 88 UG/1
88 TABLET ORAL DAILY
Qty: 90 TABLET | Refills: 3 | OUTPATIENT
Start: 2024-05-22 | End: 2025-05-22

## 2024-05-22 RX ORDER — TERAZOSIN 1 MG/1
1 CAPSULE ORAL NIGHTLY
Qty: 90 CAPSULE | Refills: 3 | OUTPATIENT
Start: 2024-05-22 | End: 2025-05-17

## 2024-05-22 RX ORDER — LISINOPRIL 40 MG/1
40 TABLET ORAL DAILY
Qty: 90 TABLET | Refills: 3 | OUTPATIENT
Start: 2024-05-22 | End: 2025-05-22

## 2024-05-22 RX ORDER — CHOLECALCIFEROL (VITAMIN D3) 25 MCG
TABLET ORAL DAILY
Qty: 90 TABLET | Refills: 0 | OUTPATIENT
Start: 2024-05-22

## 2024-05-22 RX ORDER — PANTOPRAZOLE SODIUM 40 MG/1
40 TABLET, DELAYED RELEASE ORAL
Qty: 90 TABLET | Refills: 1 | OUTPATIENT
Start: 2024-05-22

## 2024-05-23 RX ORDER — ATORVASTATIN CALCIUM 10 MG/1
10 TABLET, FILM COATED ORAL DAILY
Qty: 90 TABLET | Refills: 3 | Status: SHIPPED | OUTPATIENT
Start: 2024-05-23 | End: 2025-05-23

## 2024-05-23 RX ORDER — TERAZOSIN 1 MG/1
1 CAPSULE ORAL NIGHTLY
Qty: 90 CAPSULE | Refills: 3 | Status: SHIPPED | OUTPATIENT
Start: 2024-05-23 | End: 2025-05-18

## 2024-05-23 RX ORDER — PANTOPRAZOLE SODIUM 40 MG/1
40 TABLET, DELAYED RELEASE ORAL
Qty: 90 TABLET | Refills: 3 | Status: SHIPPED | OUTPATIENT
Start: 2024-05-23

## 2024-05-23 RX ORDER — LISINOPRIL 40 MG/1
40 TABLET ORAL DAILY
Qty: 90 TABLET | Refills: 3 | Status: SHIPPED | OUTPATIENT
Start: 2024-05-23 | End: 2025-05-23

## 2024-05-23 RX ORDER — LEVOTHYROXINE SODIUM 88 UG/1
88 TABLET ORAL DAILY
Qty: 90 TABLET | Refills: 3 | Status: SHIPPED | OUTPATIENT
Start: 2024-05-23 | End: 2025-05-23

## 2024-05-23 RX ORDER — IBANDRONATE SODIUM 150 MG/1
150 TABLET, FILM COATED ORAL
Qty: 3 TABLET | Refills: 3 | Status: SHIPPED | OUTPATIENT
Start: 2024-05-23 | End: 2025-05-23

## 2024-07-01 ENCOUNTER — PREP FOR PROCEDURE (OUTPATIENT)
Dept: OBSTETRICS AND GYNECOLOGY | Facility: CLINIC | Age: 74
End: 2024-07-01

## 2024-07-01 ENCOUNTER — OFFICE VISIT (OUTPATIENT)
Dept: OBSTETRICS AND GYNECOLOGY | Facility: CLINIC | Age: 74
End: 2024-07-01
Payer: MEDICARE

## 2024-07-01 VITALS
BODY MASS INDEX: 25.33 KG/M2 | SYSTOLIC BLOOD PRESSURE: 126 MMHG | WEIGHT: 152 LBS | DIASTOLIC BLOOD PRESSURE: 73 MMHG | HEIGHT: 65 IN | HEART RATE: 70 BPM

## 2024-07-01 DIAGNOSIS — Z01.818 PREOPERATIVE TESTING: ICD-10-CM

## 2024-07-01 DIAGNOSIS — N81.9 VAGINAL VAULT PROLAPSE: Primary | ICD-10-CM

## 2024-07-01 PROCEDURE — 3074F SYST BP LT 130 MM HG: CPT | Performed by: OBSTETRICS & GYNECOLOGY

## 2024-07-01 PROCEDURE — 3078F DIAST BP <80 MM HG: CPT | Performed by: OBSTETRICS & GYNECOLOGY

## 2024-07-01 PROCEDURE — 99214 OFFICE O/P EST MOD 30 MIN: CPT | Performed by: OBSTETRICS & GYNECOLOGY

## 2024-07-01 PROCEDURE — 51798 US URINE CAPACITY MEASURE: CPT | Performed by: OBSTETRICS & GYNECOLOGY

## 2024-07-01 PROCEDURE — 1159F MED LIST DOCD IN RCRD: CPT | Performed by: OBSTETRICS & GYNECOLOGY

## 2024-07-01 PROCEDURE — 1036F TOBACCO NON-USER: CPT | Performed by: OBSTETRICS & GYNECOLOGY

## 2024-07-01 PROCEDURE — 1126F AMNT PAIN NOTED NONE PRSNT: CPT | Performed by: OBSTETRICS & GYNECOLOGY

## 2024-07-01 RX ORDER — GABAPENTIN 600 MG/1
600 TABLET ORAL ONCE
OUTPATIENT
Start: 2024-07-01 | End: 2024-07-01

## 2024-07-01 RX ORDER — PHENAZOPYRIDINE HYDROCHLORIDE 200 MG/1
200 TABLET, FILM COATED ORAL ONCE
OUTPATIENT
Start: 2024-07-01 | End: 2024-07-01

## 2024-07-01 RX ORDER — CELECOXIB 400 MG/1
400 CAPSULE ORAL ONCE
OUTPATIENT
Start: 2024-07-01 | End: 2024-07-01

## 2024-07-01 RX ORDER — ACETAMINOPHEN 325 MG/1
975 TABLET ORAL ONCE
OUTPATIENT
Start: 2024-07-01 | End: 2024-07-01

## 2024-07-01 ASSESSMENT — ENCOUNTER SYMPTOMS
NEUROLOGICAL NEGATIVE: 1
ENDOCRINE NEGATIVE: 1
RESPIRATORY NEGATIVE: 1
CONSTITUTIONAL NEGATIVE: 1
MUSCULOSKELETAL NEGATIVE: 1
EYES NEGATIVE: 1
PSYCHIATRIC NEGATIVE: 1
CARDIOVASCULAR NEGATIVE: 1

## 2024-07-01 ASSESSMENT — PAIN SCALES - GENERAL: PAINLEVEL: 0-NO PAIN

## 2024-07-01 NOTE — PROGRESS NOTES
Urogynecology  Provider:  Lary Bonilla MD  [phone]              ASSESSMENT AND PLAN:   73-year-old female being assessed for vaginal vault prolapse and presenting to discuss definitive surgical management of the prolapse.     Diagnoses:  #1 Vaginal vault prolapse    Plan:  Vaginal vault prolapse  - Significant prolapse recurred despite previous SSLF surgery.  - Discussed the option of a more invasive robotic sacrocolpopexy with mesh, which has a 90-98% success rate.  - She is seeking definitive surgical management.  - She will undergo robotic sacrocolpopexy with anterior-posterior repair.  - She had UDS testing prior to her surgery with Dr. Grubbs, so this will not be repeated.  - She will spend one night in the hospital post-surgery.    Schedule a virtual follow-up with Dr. Bonilla in mid-August.    Scribe Attestation  By signing my name below, ISunday, Scribe, attest that this documentation has been prepared under the direction and in the presence of Lary Bonilla MD on 07/01/2024 at 11:47 AM.Agree with above.     Plan: RSCPXY, A&P rep, Perineorr, cysto   Dr. Bonilla, personally performed the services described in the documentation which was scribed virtually and confirm it is both complete and accurate.  Lary Bonilla MD        Problem List Items Addressed This Visit    None          I spent a total of eConsult Time: 35 minutes in face to face and non face to face time.        Lary Bonilla MD        HISTORY OF PRESENT ILLNESS:   73-year-old female being assessed for vaginal vault prolapse and presenting to discuss definitive surgical management of the prolapse.     Last visit with Romie 4/2023  Assessment:   72 year old female being assessed for here for follow up following SSLF, IL, perineorrhaphy, and cystoscopy on 3/2/2023. Comorbidities include: GERD, HLD, HTN, and hypothyroidism.      Diagnoses:   #1 Vaginal vault prolapse     Plan:   1. Vaginal vault prolapse,  postop   - Cleared for all normal ADLs, no weight restrictions.   - Reviewed ok to return to intercourse but that sutures are still present so may be uncomfortable for her/partner for another couple weeks  - Sent in refill for Premarin cream 2x/week. Advised not to use applicator  - Encouraged her to follow-up if bladder sx occur. Otherwise, will see her for 1 year post-op visit.       Prolapse Symptoms :  - Previously, she managed her condition with conservation measures until it became intolerable, prompting a referral to Dr. Grubbs.  - Dr. Grubbs performed a vaginal surgery on 4/5/23, which unfortunately did not resolve the prolapse.  - She reported feeling the prolapse again just 1 week post-surgery.  - Dr. Grubbs confirmed that the surgery was unsuccessful, noting that 1/5 such procedures do not work.   - She has a hx of both recotcele and cystocele.  - Despite the initial surgical attempt, the prolapse persisted, significantly affecting her quality of life, including difficulties with bowel movements and the use of vaginal cream (Premarin).  - She reports it feeling like it wants to come down when she coughs or sneezes.  - She has been using an old pessary, but she reports she would feel more comfortable with a new pessary.      Bowel Symptoms:   - She reports difficulty with bowel movement.    Sexual Activity:   - She is not currently sexually active due to her partner's recent prostate issues but hopes to resume in the future.         Past Medical History:      Past Medical History:   Diagnosis Date    Smith's esophagus without dysplasia     Smith's esophagus    Benign paroxysmal vertigo, unspecified ear 12/11/2013    Benign paroxysmal positional vertigo    Encounter for other screening for malignant neoplasm of breast     Screening for breast cancer    Headache, unspecified 12/11/2013    Headache    Meniere's disease, unspecified ear     Meniere's disease    Migraine with aura, not intractable,  without status migrainosus     Ocular migraine    Other conditions influencing health status 2013    Ocular Motility Disorders    Other conditions influencing health status     History of pregnancy    Personal history of other mental and behavioral disorders 2014    History of anxiety    Personal history of other venous thrombosis and embolism     History of deep venous thrombosis    Radiculopathy, lumbar region 2017    Lumbar radiculopathy          Past Surgical History:       Past Surgical History:   Procedure Laterality Date    APPENDECTOMY  2014    Appendectomy     SECTION, CLASSIC  2014     Section    OTHER SURGICAL HISTORY  10/08/2020    Colonoscopy    OTHER SURGICAL HISTORY  2014    Pyloromyotomy    TOTAL ABDOMINAL HYSTERECTOMY W/ BILATERAL SALPINGOOPHORECTOMY  2014    Total Abdominal Hysterectomy With Removal Of Both Ovaries         Medications:       Prior to Admission medications    Medication Sig Start Date End Date Taking? Authorizing Provider   atorvastatin (Lipitor) 10 mg tablet Take 1 tablet (10 mg) by mouth once daily. 24  Juan Antonio Mclain MD   cetirizine (ZyrTEC) 10 mg tablet Take 1 tablet (10 mg) by mouth once daily. 17   Historical Provider, MD   cholecalciferol (Vitamin D-3) 25 MCG (1000 UT) tablet Take 3 tablets (75 mcg) by mouth once daily.    Historical Provider, MD   estrogens, conjugated, (Premarin) vaginal cream Insert/use 0.5 grams twice weekly or as directed 24   Juan Antonio Mclain MD   ibandronate (Boniva) 150 mg tablet Take 1 tablet (150 mg) by mouth every 30 (thirty) days. Take in morning with full glass of water on an empty stomach. No food, drink, meds, or lying down for 60 minutes after. 24  Juan Antonio Mclain MD   levothyroxine (Synthroid, Levoxyl) 88 mcg tablet Take 1 tablet (88 mcg) by mouth once daily. 24  Juan Antonio Mclain MD   lisinopril 40 mg tablet Take 1 tablet (40 mg) by mouth  once daily. 5/23/24 5/23/25  Juan Antonio Mclain MD   mv-min/iron/folic/calcium/vitK (WOMEN'S MULTIVITAMIN ORAL) Take 1 tablet by mouth once daily. 5/17/18   Historical Provider, MD   pantoprazole (ProtoNix) 40 mg EC tablet Take 1 tablet (40 mg) by mouth once daily in the morning. Take before meals. 5/23/24   Juan Antonio Mclain MD   terazosin (Hytrin) 1 mg capsule Take 1 capsule (1 mg) by mouth once daily at bedtime. 5/23/24 5/18/25  Juan Antonio Mclain MD        ROS  Review of Systems   Constitutional: Negative.    HENT: Negative.     Eyes: Negative.    Respiratory: Negative.     Cardiovascular: Negative.    Gastrointestinal:         Difficulty with bowel movements   Endocrine: Negative.    Genitourinary:         Significant prolapse   Musculoskeletal: Negative.    Neurological: Negative.    Psychiatric/Behavioral: Negative.          Blood, Urine   Date Value Ref Range Status   03/28/2023 NEGATIVE NEGATIVE Final     Nitrite, Urine   Date Value Ref Range Status   03/28/2023 NEGATIVE NEGATIVE Final     Urobilinogen, Urine   Date Value Ref Range Status   03/28/2023 <2.0 0.0 - 1.9 mg/dL Final         PHYSICAL EXAM:      LMP  (LMP Unknown)      No LMP recorded (lmp unknown). Patient has had a hysterectomy.      Declines chaperone for physical exam.      Well developed, well nourished, in no apparent distress.   Neurologic/Psychiatric:  Awake, Alert and Oriented times 3.  Affect normal.     GENITAL/URINARY:       PVR = 104 by U/S    External Genitalia:  The patient has normal appearing external genitalia, normal skenes and bartholins glands, and a normal hair distribution.  Her vulva is without lesions, erythema or discharge.  It is non-tender with appropriate sensation.     Urethral Meatus:  Size normal, Location normal, Lesions absent, Prolapse absent,      Urethra:  Fullness absent, Masses absent,      Bladder:  Fullness absent, Masses absent, Tenderness absent,      Vagina:  General appearance normal, Estrogen effect normal,  Discharge absent, Lesions absent.     Cervix: absent  Uterus:  surgically absent  Adnexa: normal     Anus/Perineum:  Lesions absent and Masses absent normal sphincter tone, no lesions  No Hemorrhoids, Normal Perineum       POP-Q:  Position: standing    Aa: 0       Ba:  C: -4   Gh:  Pb:  TVL: 8         Ap: 0 Bp:  D: x               Data and DIAGNOSTIC STUDIES REVIEWED   No results found.   Lab Results   Component Value Date    URINECULTURE Escherichia coli (A) 06/14/2023      Lab Results   Component Value Date    GLUCOSE 98 05/10/2024    CALCIUM 9.6 05/10/2024     05/10/2024    K 4.5 05/10/2024    CO2 27 05/10/2024     05/10/2024    BUN 15 05/10/2024    CREATININE 1.00 05/10/2024     Lab Results   Component Value Date    WBC 8.7 02/24/2023    HGB 14.5 02/24/2023    HCT 44.6 02/24/2023    MCV 92 02/24/2023     02/24/2023          Lary Bonilla MD

## 2024-07-02 ENCOUNTER — TELEPHONE (OUTPATIENT)
Dept: OBSTETRICS AND GYNECOLOGY | Facility: CLINIC | Age: 74
End: 2024-07-02
Payer: MEDICARE

## 2024-07-02 NOTE — TELEPHONE ENCOUNTER
Call to patient on 7/1/2024 to schedule surgery with Dr Bonilla at [facility]. Procedure is robotic sacrocolpopexy and anterior and posterior repairs with or without perineorrhaphy and cystoscopy. CPM ordered. Patient agrees with date of 8/30/2024.

## 2024-07-19 ENCOUNTER — TELEPHONE (OUTPATIENT)
Dept: OBSTETRICS AND GYNECOLOGY | Facility: CLINIC | Age: 74
End: 2024-07-19
Payer: MEDICARE

## 2024-07-19 NOTE — TELEPHONE ENCOUNTER
Call to patient to let her know that a sooner OR date just came available. Patient agrees with new date of 8/2/2024 at [facility] with Dr Bonilla.

## 2024-07-23 ENCOUNTER — CLINICAL SUPPORT (OUTPATIENT)
Dept: PREADMISSION TESTING | Facility: HOSPITAL | Age: 74
End: 2024-07-23
Payer: MEDICARE

## 2024-07-24 ENCOUNTER — PRE-ADMISSION TESTING (OUTPATIENT)
Dept: PREADMISSION TESTING | Facility: HOSPITAL | Age: 74
End: 2024-07-24
Payer: MEDICARE

## 2024-07-24 ENCOUNTER — LAB (OUTPATIENT)
Dept: LAB | Facility: LAB | Age: 74
End: 2024-07-24
Payer: MEDICARE

## 2024-07-24 VITALS
SYSTOLIC BLOOD PRESSURE: 135 MMHG | HEIGHT: 65 IN | OXYGEN SATURATION: 98 % | RESPIRATION RATE: 18 BRPM | WEIGHT: 152.12 LBS | DIASTOLIC BLOOD PRESSURE: 69 MMHG | HEART RATE: 69 BPM | TEMPERATURE: 97.3 F | BODY MASS INDEX: 25.34 KG/M2

## 2024-07-24 DIAGNOSIS — R39.9 GENITOURINARY SYMPTOMS: ICD-10-CM

## 2024-07-24 DIAGNOSIS — Z01.818 PREOPERATIVE TESTING: ICD-10-CM

## 2024-07-24 LAB
ABO GROUP (TYPE) IN BLOOD: NORMAL
ANION GAP SERPL CALC-SCNC: 12 MMOL/L (ref 10–20)
ANTIBODY SCREEN: NORMAL
ATRIAL RATE: 62 BPM
BUN SERPL-MCNC: 14 MG/DL (ref 6–23)
CALCIUM SERPL-MCNC: 8.9 MG/DL (ref 8.6–10.3)
CHLORIDE SERPL-SCNC: 104 MMOL/L (ref 98–107)
CO2 SERPL-SCNC: 26 MMOL/L (ref 21–32)
CREAT SERPL-MCNC: 0.82 MG/DL (ref 0.5–1.05)
EGFRCR SERPLBLD CKD-EPI 2021: 76 ML/MIN/1.73M*2
ERYTHROCYTE [DISTWIDTH] IN BLOOD BY AUTOMATED COUNT: 12.4 % (ref 11.5–14.5)
GLUCOSE SERPL-MCNC: 98 MG/DL (ref 74–99)
HCT VFR BLD AUTO: 42.6 % (ref 36–46)
HGB BLD-MCNC: 14 G/DL (ref 12–16)
MCH RBC QN AUTO: 30 PG (ref 26–34)
MCHC RBC AUTO-ENTMCNC: 32.9 G/DL (ref 32–36)
MCV RBC AUTO: 91 FL (ref 80–100)
NRBC BLD-RTO: 0 /100 WBCS (ref 0–0)
P AXIS: 61 DEGREES
P OFFSET: 184 MS
P ONSET: 138 MS
PLATELET # BLD AUTO: 280 X10*3/UL (ref 150–450)
POTASSIUM SERPL-SCNC: 4.2 MMOL/L (ref 3.5–5.3)
PR INTERVAL: 180 MS
Q ONSET: 228 MS
QRS COUNT: 10 BEATS
QRS DURATION: 74 MS
QT INTERVAL: 400 MS
QTC CALCULATION(BAZETT): 406 MS
QTC FREDERICIA: 404 MS
R AXIS: -7 DEGREES
RBC # BLD AUTO: 4.67 X10*6/UL (ref 4–5.2)
RH FACTOR (ANTIGEN D): NORMAL
SODIUM SERPL-SCNC: 138 MMOL/L (ref 136–145)
T AXIS: 59 DEGREES
T OFFSET: 428 MS
VENTRICULAR RATE: 62 BPM
WBC # BLD AUTO: 7.3 X10*3/UL (ref 4.4–11.3)

## 2024-07-24 PROCEDURE — 86850 RBC ANTIBODY SCREEN: CPT

## 2024-07-24 PROCEDURE — 99204 OFFICE O/P NEW MOD 45 MIN: CPT | Performed by: NURSE PRACTITIONER

## 2024-07-24 PROCEDURE — 86900 BLOOD TYPING SEROLOGIC ABO: CPT

## 2024-07-24 PROCEDURE — 87081 CULTURE SCREEN ONLY: CPT | Mod: AHULAB | Performed by: NURSE PRACTITIONER

## 2024-07-24 PROCEDURE — 80048 BASIC METABOLIC PNL TOTAL CA: CPT

## 2024-07-24 PROCEDURE — 87086 URINE CULTURE/COLONY COUNT: CPT

## 2024-07-24 PROCEDURE — 85027 COMPLETE CBC AUTOMATED: CPT

## 2024-07-24 PROCEDURE — 93010 ELECTROCARDIOGRAM REPORT: CPT | Performed by: INTERNAL MEDICINE

## 2024-07-24 PROCEDURE — 86901 BLOOD TYPING SEROLOGIC RH(D): CPT

## 2024-07-24 PROCEDURE — 36415 COLL VENOUS BLD VENIPUNCTURE: CPT

## 2024-07-24 PROCEDURE — 93005 ELECTROCARDIOGRAM TRACING: CPT | Performed by: NURSE PRACTITIONER

## 2024-07-24 RX ORDER — CHLORHEXIDINE GLUCONATE ORAL RINSE 1.2 MG/ML
15 SOLUTION DENTAL 2 TIMES DAILY
Qty: 473 ML | Refills: 0 | Status: SHIPPED | OUTPATIENT
Start: 2024-07-24

## 2024-07-24 ASSESSMENT — ENCOUNTER SYMPTOMS
RESPIRATORY NEGATIVE: 1
NEUROLOGICAL NEGATIVE: 1
GASTROINTESTINAL NEGATIVE: 1
ENDOCRINE NEGATIVE: 1
CONSTITUTIONAL NEGATIVE: 1
NECK NEGATIVE: 1
MUSCULOSKELETAL NEGATIVE: 1

## 2024-07-24 NOTE — CPM/PAT H&P
[facility] Evaluation       Name: Eula Ku (Eula Ku)  /Age: 1950/73 y.o.     In-Person           HPI        Date of Consult: 24    Referring Provider: Dr. Bonilla    Surgery, Date, and Length: Robot Assisted Sacrocolpoplexy  Anterior & Posterior Repair; Possible Perineorrhaphy  Cystoscopy, 24    Eula Ku is a 73 year-old female who presents to the [facility] for perioperative risk assessment prior to surgery.    Patient presents with a primary diagnosis of vaginal vault prolapse.  she managed her condition with conservation measures until it became intolerable, prompting a referral to Dr. Grubbs. Dr. Grubbs performed a vaginal surgery on 23, which unfortunately did not resolve the prolapse. She reported feeling the prolapse again just 1 week post-surgery. Dr. Grubbs confirmed that the surgery was unsuccessful, noting that 1/5 such procedures do not work. She has a hx of both recotcele and cystocele. Despite the initial surgical attempt, the prolapse persisted, significantly affecting her quality of life, including difficulties with bowel movements and the use of vaginal cream (Premarin). She reports it feeling like it wants to come down when she coughs or sneezes. She has been using an old pessary, but she reports she would feel more comfortable with a new pessary.     This note was created in part upon personal review of patient's medical records.      Patient is scheduled to have Robot Assisted Sacrocolpoplexy  Anterior & Posterior Repair; Possible Perineorrhaphy  Cystoscopy      Pt denies any past history of anesthetic complications such as  awareness, prolonged sedation, dental damage, aspiration, cardiac arrest, difficult intubation, difficult I.V. access or unexpected hospital admissions.  NO malignant hyperthermia and or pseudocholinesterase deficiency.  No history of blood transfusions     STOP BANG 2    The patient is not a S[employer] and will accept blood  and blood products if medically indicated.   Type and screen sent.     Past Medical History:   Diagnosis Date    Benign paroxysmal vertigo, unspecified ear 2013    Benign paroxysmal positional vertigo    Hyperlipidemia     Hypertension     Hyponatremia     Hypothyroidism     Meniere's disease, unspecified ear     Meniere's disease    Migraine with aura, not intractable, without status migrainosus     Ocular migraine    Osteoporosis     Personal history of other venous thrombosis and embolism      d/t pregnancy    PONV (postoperative nausea and vomiting)     Radiculopathy, lumbar region 2017    Lumbar radiculopathy    Retinal vein occlusion (CMS-HCC)     Vaginal vault prolapse        Past Surgical History:   Procedure Laterality Date    APPENDECTOMY  2014    Appendectomy     SECTION, CLASSIC  2014     Section    OTHER SURGICAL HISTORY  10/08/2020    Colonoscopy    OTHER SURGICAL HISTORY  2014    Pyloromyotomy    SACROCOLPOPEXY      TOTAL ABDOMINAL HYSTERECTOMY W/ BILATERAL SALPINGOOPHORECTOMY  2014    Total Abdominal Hysterectomy With Removal Of Both Ovaries     .  Social History     Tobacco Use    Smoking status: Never    Smokeless tobacco: Never   Vaping Use    Vaping status: Never Used   Substance Use Topics    Alcohol use: Not Currently    Drug use: Never        Family History   Problem Relation Name Age of Onset    Cancer Mother      Cancer Father      Breast cancer Sister  45       Allergies   Allergen Reactions    Hydrochlorothiazide Other     hyponatremia    Iodine Unknown    Metaxalone Unknown    Shellfish Containing Products Swelling    Tetanus Vaccines And Toxoid Diarrhea and Other    Amlodipine Other     Lower extremity edema    Penicillins Hives and Rash       Current Outpatient Medications:     atorvastatin (Lipitor) 10 mg tablet, Take 1 tablet (10 mg) by mouth once daily., Disp: 90 tablet, Rfl: 3    cetirizine (ZyrTEC) 10 mg tablet, Take 1 tablet  "(10 mg) by mouth once daily., Disp: , Rfl:     cholecalciferol (Vitamin D-3) 25 MCG (1000 UT) tablet, Take 3 tablets (75 mcg) by mouth once daily., Disp: , Rfl:     estrogens, conjugated, (Premarin) vaginal cream, Insert/use 0.5 grams twice weekly or as directed, Disp: 60 g, Rfl: 3    ibandronate (Boniva) 150 mg tablet, Take 1 tablet (150 mg) by mouth every 30 (thirty) days. Take in morning with full glass of water on an empty stomach. No food, drink, meds, or lying down for 60 minutes after., Disp: 3 tablet, Rfl: 3    levothyroxine (Synthroid, Levoxyl) 88 mcg tablet, Take 1 tablet (88 mcg) by mouth once daily., Disp: 90 tablet, Rfl: 3    lisinopril 40 mg tablet, Take 1 tablet (40 mg) by mouth once daily., Disp: 90 tablet, Rfl: 3    mv-min/iron/folic/calcium/vitK (WOMEN'S MULTIVITAMIN ORAL), Take 1 tablet by mouth once daily., Disp: , Rfl:     pantoprazole (ProtoNix) 40 mg EC tablet, Take 1 tablet (40 mg) by mouth once daily in the morning. Take before meals., Disp: 90 tablet, Rfl: 3    chlorhexidine (Peridex) 0.12 % solution, Use 15 mL in the mouth or throat 2 times a day. Use night before surgery and morning of surgery Swish and spit, Disp: 473 mL, Rfl: 0      Heart Rate:  [69]   Temp:  [36.3 °C (97.3 °F)]   Resp:  [18]   BP: (135)/(69)   Height:  [165.1 cm (5' 5\")]   Weight:  [69 kg (152 lb 1.9 oz)]   SpO2:  [98 %]      PAT ROS:   Constitutional:   neg    Neuro/Psych:   neg    Eyes:    WEARS GLASSES  Ears:   neg    Nose:   neg    Mouth:   neg    Throat:   neg    Neck:   neg    Cardio:    FUNCTIONAL 4 METS, DENIES SOB WALKING UP 2 FLIGHTS OF STAIRS  Respiratory:   neg    Endocrine:   neg    GI:   neg    :   neg    Musculoskeletal:   neg    Hematologic:   neg    Skin:  neg        Physical Exam  Vitals reviewed. Physical exam within normal limits.          PAT AIRWAY:   Airway:     Mallampati::  II    Neck ROM::  Full  normal      Lab Results   Component Value Date    GLUCOSE 98 07/24/2024    CALCIUM 8.9 " 07/24/2024     07/24/2024    K 4.2 07/24/2024    CO2 26 07/24/2024     07/24/2024    BUN 14 07/24/2024    CREATININE 0.82 07/24/2024      Lab Results   Component Value Date    WBC 7.3 07/24/2024    HGB 14.0 07/24/2024    HCT 42.6 07/24/2024    MCV 91 07/24/2024     07/24/2024      LABS PER DR. MACHADO    EKG in PAT 7/24/24:  NSR  Septal infarct, age undetermined  Abnormal EKG  HR 62 bpm    Assessment and Plan:         Patient is a 73-year-old female scheduled for a with Dr. Machado on 8/2/24 .  Patient has no active cardiac symptoms.   Patient denies any chest pain, tightness, heaviness, pressure, radiating pain, palpitations, irregular heartbeats, lightheadedness, cough, congestion, shortness of breath, LAUREN, PND, near syncope, weight loss or gain.     RCRI  1  , 6 % Risk of MACE    Cardiology:  -- Controlled HTN: EKG ordered    Hematology:  Patient instructed to ambulate as soon as possible postoperatively to decrease thromboembolic risk.   Initiate mechanical DVT prophylaxis as soon as possible and initiate chemical prophylaxis when deemed safe from a bleeding standpoint post surgery.     Caprini: 7    Renal:  -- Recommendations to avoid nephrotoxic drugs and carefully monitor fluid status to maintain euvolemia. Use dose adjusted medications as needed for the underlying level of renal function.      LABS PER DR. MACHADO    URINE CULTURE AND MRSA ORDERED PER PROTOCOL    Risk assessment complete.  Patient is scheduled for a low surgical risk procedure.        Preoperative medication instructions were provided and reviewed with the patient.  Any additional testing or evaluation was explained to the patient.  Nothing by mouth instructions were discussed and patient's questions were answered prior to conclusion to this encounter.  Patient verbalized understanding of preoperative instructions given in preadmission testing; discharge instructions available in EMR.    This note was dictated by a speech  recognition.  Minor errors may have been detected in a speech recognition.

## 2024-07-24 NOTE — CPM/PAT NURSE NOTE
CPM/PAT Nurse Note      Name: Eula Ku (Eula Ku)  /Age: 1950/73 y.o.       Past Medical History:   Diagnosis Date    Benign paroxysmal vertigo, unspecified ear 2013    Benign paroxysmal positional vertigo    Hyperlipidemia     Hypertension     Hyponatremia     Hypothyroidism     Meniere's disease, unspecified ear     Meniere's disease    Migraine with aura, not intractable, without status migrainosus     Ocular migraine    Osteoporosis     Personal history of other venous thrombosis and embolism      d/t pregnancy    PONV (postoperative nausea and vomiting)     Radiculopathy, lumbar region 2017    Lumbar radiculopathy    Retinal vein occlusion (CMS-HCC)     Vaginal vault prolapse        Past Surgical History:   Procedure Laterality Date    APPENDECTOMY  2014    Appendectomy     SECTION, CLASSIC  2014     Section    OTHER SURGICAL HISTORY  10/08/2020    Colonoscopy    OTHER SURGICAL HISTORY  2014    Pyloromyotomy    SACROCOLPOPEXY      TOTAL ABDOMINAL HYSTERECTOMY W/ BILATERAL SALPINGOOPHORECTOMY  2014    Total Abdominal Hysterectomy With Removal Of Both Ovaries       Patient  has no history on file for sexual activity.    Family History   Problem Relation Name Age of Onset    Cancer Mother      Cancer Father      Breast cancer Sister  45       Allergies   Allergen Reactions    Hydrochlorothiazide Other     hyponatremia    Iodine Unknown    Metaxalone Unknown    Shellfish Containing Products Swelling    Tetanus Vaccines And Toxoid Diarrhea and Other    Amlodipine Other     Lower extremity edema    Penicillins Hives and Rash       Prior to Admission medications    Medication Sig Start Date End Date Taking? Authorizing Provider   atorvastatin (Lipitor) 10 mg tablet Take 1 tablet (10 mg) by mouth once daily. 24  Juan Antonio Mclain MD   cetirizine (ZyrTEC) 10 mg tablet Take 1 tablet (10 mg) by mouth once daily. 17   Historical  Provider, MD   chlorhexidine (Peridex) 0.12 % solution Use 15 mL in the mouth or throat 2 times a day. Use night before surgery and morning of surgery Swish and spit 7/24/24   DESIREE Young-CNP   cholecalciferol (Vitamin D-3) 25 MCG (1000 UT) tablet Take 3 tablets (75 mcg) by mouth once daily.    Historical Provider, MD   estrogens, conjugated, (Premarin) vaginal cream Insert/use 0.5 grams twice weekly or as directed 5/23/24   Juan Antonio Mclain MD   ibandronate (Boniva) 150 mg tablet Take 1 tablet (150 mg) by mouth every 30 (thirty) days. Take in morning with full glass of water on an empty stomach. No food, drink, meds, or lying down for 60 minutes after. 5/23/24 5/23/25  Juan Antonio Mclain MD   levothyroxine (Synthroid, Levoxyl) 88 mcg tablet Take 1 tablet (88 mcg) by mouth once daily. 5/23/24 5/23/25  Juan Antonio Mclain MD   lisinopril 40 mg tablet Take 1 tablet (40 mg) by mouth once daily. 5/23/24 5/23/25  Juan Antonio Mclain MD   mv-min/iron/folic/calcium/vitK (WOMEN'S MULTIVITAMIN ORAL) Take 1 tablet by mouth once daily. 5/17/18   Historical Provider, MD   pantoprazole (ProtoNix) 40 mg EC tablet Take 1 tablet (40 mg) by mouth once daily in the morning. Take before meals. 5/23/24   Juan Antonio Mclain MD   terazosin (Hytrin) 1 mg capsule Take 1 capsule (1 mg) by mouth once daily at bedtime.  Patient not taking: Reported on 7/1/2024 5/23/24 7/23/24  Juan Antonio Mclain MD        PAT ROS     DASI Risk Score    No data to display       Caprini DVT Assessment    No data to display       Modified Frailty Index    No data to display       CHADS2 Stroke Risk  Current as of 36 minutes ago        N/A 3 to 100%: High Risk   2 to < 3%: Medium Risk   0 to < 2%: Low Risk     Last Change: N/A          This score determines the patient's risk of having a stroke if the patient has atrial fibrillation.        This score is not applicable to this patient. Components are not calculated.          Revised Cardiac Risk Index    No data to  display       Apfel Simplified Score    No data to display       Risk Analysis Index Results This Encounter    No data found in the last 1 encounters.         Nurse Plan of Action:     .gen

## 2024-07-24 NOTE — PREPROCEDURE INSTRUCTIONS
Medication List            Accurate as of July 24, 2024  9:22 AM. Always use your most recent med list.                atorvastatin 10 mg tablet  Commonly known as: Lipitor  Take 1 tablet (10 mg) by mouth once daily.  Medication Adjustments for Surgery: Take morning of surgery with sip of water, no other fluids     cetirizine 10 mg tablet  Commonly known as: ZyrTEC  Medication Adjustments for Surgery: Continue until night before surgery     chlorhexidine 0.12 % solution  Commonly known as: Peridex  Use 15 mL in the mouth or throat 2 times a day. Use night before surgery and morning of surgery Swish and spit  Medication Adjustments for Surgery: Take morning of surgery with sip of water, no other fluids     cholecalciferol 25 MCG (1000 UT) tablet  Commonly known as: Vitamin D-3  Medication Adjustments for Surgery: Continue until night before surgery     estrogens (conjugated) vaginal cream  Commonly known as: Premarin  Insert/use 0.5 grams twice weekly or as directed  Medication Adjustments for Surgery: Continue until night before surgery     ibandronate 150 mg tablet  Commonly known as: Boniva  Take 1 tablet (150 mg) by mouth every 30 (thirty) days. Take in morning with full glass of water on an empty stomach. No food, drink, meds, or lying down for 60 minutes after.  Medication Adjustments for Surgery: Continue until night before surgery     levothyroxine 88 mcg tablet  Commonly known as: Synthroid, Levoxyl  Take 1 tablet (88 mcg) by mouth once daily.  Medication Adjustments for Surgery: Take morning of surgery with sip of water, no other fluids     lisinopril 40 mg tablet  Take 1 tablet (40 mg) by mouth once daily.  Medication Adjustments for Surgery: Stop 1 day before surgery     pantoprazole 40 mg EC tablet  Commonly known as: ProtoNix  Take 1 tablet (40 mg) by mouth once daily in the morning. Take before meals.  Medication Adjustments for Surgery: Take morning of surgery with sip of water, no other fluids      WOMEN'S MULTIVITAMIN ORAL  Medication Adjustments for Surgery: Stop 7 days before surgery                        **Concerning above medication instructions, if medication is normally taken at night, continue normal schedule.**  **DO NOT TAKE NIGHT PRIOR AND MORNING OF SURGERY**    CONTACT SURGEON'S OFFICE IF YOU DEVELOP:  * Fever = 100.4 F   * New respiratory symptoms (e.g. cough, shortness of breath, respiratory distress, sore throat)  * Recent loss of taste or smell  *Flu like symptoms such as headache, fatigue or gastrointestinal symptoms  * You develop any open sores, shingles, burning or painful urination   AND/OR:  * You no longer wish to have the surgery.  * Any other personal circumstances change that may lead to the need to cancel or defer this surgery.  *You were admitted to any hospital within one week of your planned procedure.    SMOKING:  *Quitting smoking can make a huge difference to your health and recovery from surgery.    *If you need help with quitting, call 9-024-QUIT-NOW.    THE DAY BEFORE SURGERY:  *Do not eat any food after midnight the night before surgery.   *You are permitted to drink clear liquids (i.e. water, black coffee (no milk or cream), tea, apple juice and electrolyte drinks (gatorade)) up to 13.5 ounces, up to 2 hours before your arrival time.  *You may chew gum until 2 hours before your surgery    SURGICAL TIME  *You will be contacted between 2 p.m. and 6 p.m. the business day before your surgery with your arrival time.  *If you haven't received a call by 6pm, call [phone].  *Scheduled surgery times may change and you will be notified if this occurs-check your personal voicemail for any updates.    ON THE MORNING OF SURGERY:  *Wear comfortable, loose fitting clothing.   *Do not use moisturizers, creams, lotions or perfume.  *All jewelry and valuables should be left at home.  *Prosthetic devices such as contact lenses, hearing aids, dentures, eyelash extensions, hairpins  and body piercing must be removed before surgery.    BRING WITH YOU:  *Photo ID and insurance card  *Current list of medicines and allergies  *Pacemaker/Defibrillator/Heart stent cards  *CPAP machine and mask  *Slings/splints/crutches  *Copy of your complete Advanced Directive/DHPOA-if applicable  *Neurostimulator implant remote    PARKING AND ARRIVAL:  *Check in at the Main Entrance desk and let them know you are here for surgery.  *You will be directed to the 2nd floor surgical waiting area.    AFTER OUTPATIENT SURGERY:  *A responsible adult MUST accompany you at the time of discharge and stay with you for 24 hours after your surgery.  *You may NOT drive yourself home after surgery.  *You may use a taxi or ride sharing service ([employer], [employer]) to return home ONLY if you are accompanied by a friend or family member.  *Instructions for resuming your medications will be provided by your surgeon.         Patient Information: Oral/Dental Rinse  **This is a prescription; pick it up at your preferred local pharmacy **  What is oral/dental rinse?   It is a mouthwash. It is a way of cleaning the mouth with a germ killing solution before your surgery.  The solution contains chlorhexidine, commonly known as CHG.   It is used inside the mouth to kill a bacteria known as Staphylococcus aureus.  Let your doctor know if you are allergic to Chlorhexidine.    Why do I need to use CHG oral/dental rinse?  The CHG oral/dental rinse helps to kill a bacteria in your mouth known a Staphylococcus aureus.     This reduces the risk of infection at the surgical site.      Using your CHG oral/dental rinse  STEPS:  Use your CHG oral/dental rinse after you brush your teeth the night before (at bedtime) and the morning of your surgery.  Follow all directions on your prescription label.    Use the cap on the container to measure 15ml (fill cap to fill line)  Swish (gargle if you can) the mouthwash in your mouth for at least 30 seconds, (do not to  swallow) spit out  After you use your CHG rinse, do not rinse your mouth with water, drink or eat.  Please refer to prescription label for the appropriate time to resume oral intake  Dental rinse comes in one size bottle: 473ml ~16oz.  You will have leftover    rinse, discard after this use.    What side effects might I have using the CHG oral/dental rinse?  CHG rinse will stick to plaque on the teeth.  Brush and floss just before use.  Teeth brushing will help avoid staining of plaque during use.    Who should I contact if I have questions about the CHG oral/dental rinse?  Please call [facility], Preadmission Testing at [phone] if you have any questions       Home Preoperative Antibacterial Shower     What is a home preoperative antibacterial shower?  This shower is a way of cleaning the skin with a germ killing soap before surgery.  The soap contains chlorhexidine, commonly known as CHG.  CHG is a soap for your skin with germ killing ability.  Let your doctor know if you are allergic to chlorhexidine.    Why do I need to take a preoperative antibacterial shower?  Skin is not sterile.  It is best to try to make your skin as free of germs as possible before surgery.  Proper cleansing with a germ killing soap before surgery can lower the number of germs on your skin.  This helps to reduce the risk of infection at the surgical site.  Following the instructions listed below will help you prepare your skin for surgery.      How do I use the CHG skin cleanser?  Steps:  Begin using your CHG soap five days before your scheduled surgery on ________________________.    Days 1-4 Shower before bed:  Wash your face and genitals with your normal soap and rinse.    Wash and rinse your hair using the CHG soap. Rinse completely, do not condition your   Hair.          3.    Apply the CHG soap to a clean wet washcloth.  Turn the water off or move away                From the water spray to avoid  premature rinsing of the CHG soap as you are applying.     4.   Lather your entire body from the neck down.  Do not use on your face or genitals.   Pay special attention to the area(s) where your incision(s) will be located unless they are on your face.  Avoid scrubbing your skin too hard.  The important point is to have the CHG soap sit on your skin for 3 minutes.    When the 3 minutes are up, turn on the water and rinse the CHG soap off your body completely.   Pat yourself dry with a clean, freshly-laundered towel.  Dress in clean, freshly laundered night clothes.    Be sure to sleep with clean, freshly laundered sheets.  Day 5:  Last shower is the morning before surgery: Follow above Instructions.    NOTE:    *Hair extensions should be removed    *Keep CHG soap out of eyes and ear canals   *DO NOT wash with regular soap on your body after you have used the CHG        soap solution  *DO NOT apply powders, lotions, or perfume.  *Deodorant may be used days 1-4, BUT NOT the day of surgery    Who should I contact if I have any questions regarding the use of CHG soap?  Call the [facility], Preadmission Testing at [phone] if you have any questions.              Patient Information: Pre-Operative Infection Prevention Measures     Why did I have my nose, under my arms and groin swabbed?  The purpose of the swab is to identify Staphylococcus aureus inside your nose or on your skin.  The swab was sent to the laboratory for culture.  A positive swab/culture for Staphylococcus aureus is called colonization or carriage.      What is Staphylococcus aureus?  Staphylococcus aureus, also known as “staph”, is a germ found on the skin or in the nose of healthy people.  Sometimes Staphylococcus aureus can get into the body and cause an infection.  This can be minor (such as pimples, boils or other skin problems).  It might also be serious (such as blood infection, pneumonia or a surgical site  infection).    What is Staphylococcus aureus colonization or carriage?  Colonization or carriage means that a person has the germ but is not sick from it.  These bacteria can be spread on the hands or when breathing or sneezing.    How is Staphylococcus aureus spread?  It is most often spread by close contact with a person or item that carries it.    What happens if my culture is positive for Staphylococcus aureus?  Your doctor/medical team will use this information to guide any antibiotic treatment which may be necessary.  Regardless of the culture results, we will clean the inside of your nose with a betadine swab just before you have your surgery.      Will I get an infection if I have Staphylococcus aureus in my nose or on my skin?  Anyone can get an infection with Staphylococcus aureus.  However, the best way to reduce your risk of infection is to follow the instructions provided to you for the use of your CHG soap and dental rinse.        Who should I contact if I have any questions?  Call the [facility], Preadmission Testing at [phone] if you have any questions.

## 2024-07-24 NOTE — CPM/PAT NURSE NOTE
CPM/PAT Nurse Note      Name: Eula Ku (Eula Ku)  /Age: 1950/73 y.o.       Past Medical History:   Diagnosis Date    Benign paroxysmal vertigo, unspecified ear 2013    Benign paroxysmal positional vertigo    Hyperlipidemia     Hypertension     Hyponatremia     Hypothyroidism     Meniere's disease, unspecified ear     Meniere's disease    Migraine with aura, not intractable, without status migrainosus     Ocular migraine    Osteoporosis     Personal history of other venous thrombosis and embolism      d/t pregnancy    PONV (postoperative nausea and vomiting)     Radiculopathy, lumbar region 2017    Lumbar radiculopathy    Retinal vein occlusion (CMS-HCC)     Vaginal vault prolapse        Past Surgical History:   Procedure Laterality Date    APPENDECTOMY  2014    Appendectomy     SECTION, CLASSIC  2014     Section    OTHER SURGICAL HISTORY  10/08/2020    Colonoscopy    OTHER SURGICAL HISTORY  2014    Pyloromyotomy    SACROCOLPOPEXY      TOTAL ABDOMINAL HYSTERECTOMY W/ BILATERAL SALPINGOOPHORECTOMY  2014    Total Abdominal Hysterectomy With Removal Of Both Ovaries       Patient  has no history on file for sexual activity.    Family History   Problem Relation Name Age of Onset    Cancer Mother      Cancer Father      Breast cancer Sister  45       Allergies   Allergen Reactions    Hydrochlorothiazide Other     hyponatremia    Iodine Unknown    Metaxalone Unknown    Shellfish Containing Products Swelling    Tetanus Vaccines And Toxoid Diarrhea and Other    Amlodipine Other     Lower extremity edema    Penicillins Hives and Rash       Prior to Admission medications    Medication Sig Start Date End Date Taking? Authorizing Provider   atorvastatin (Lipitor) 10 mg tablet Take 1 tablet (10 mg) by mouth once daily. 24  Juan Antonio Mclain MD   cetirizine (ZyrTEC) 10 mg tablet Take 1 tablet (10 mg) by mouth once daily. 17   Historical  Provider, MD   chlorhexidine (Peridex) 0.12 % solution Use 15 mL in the mouth or throat 2 times a day. Use night before surgery and morning of surgery Swish and spit 7/24/24   DESIREE Young-CNP   cholecalciferol (Vitamin D-3) 25 MCG (1000 UT) tablet Take 3 tablets (75 mcg) by mouth once daily.    Historical Provider, MD   estrogens, conjugated, (Premarin) vaginal cream Insert/use 0.5 grams twice weekly or as directed 5/23/24   Juan Antonio Mclain MD   ibandronate (Boniva) 150 mg tablet Take 1 tablet (150 mg) by mouth every 30 (thirty) days. Take in morning with full glass of water on an empty stomach. No food, drink, meds, or lying down for 60 minutes after. 5/23/24 5/23/25  Juan Antonio Mclain MD   levothyroxine (Synthroid, Levoxyl) 88 mcg tablet Take 1 tablet (88 mcg) by mouth once daily. 5/23/24 5/23/25  Juan Antonio Mclain MD   lisinopril 40 mg tablet Take 1 tablet (40 mg) by mouth once daily. 5/23/24 5/23/25  Juan Antonio Mclain MD   mv-min/iron/folic/calcium/vitK (WOMEN'S MULTIVITAMIN ORAL) Take 1 tablet by mouth once daily. 5/17/18   Historical Provider, MD   pantoprazole (ProtoNix) 40 mg EC tablet Take 1 tablet (40 mg) by mouth once daily in the morning. Take before meals. 5/23/24   Juan Antonio Mclain MD   terazosin (Hytrin) 1 mg capsule Take 1 capsule (1 mg) by mouth once daily at bedtime.  Patient not taking: Reported on 7/1/2024 5/23/24 7/23/24  Juan Antonio Mclain MD        PAT ROS     DASI Risk Score    No data to display       Caprini DVT Assessment    No data to display       Modified Frailty Index    No data to display       CHADS2 Stroke Risk  Current as of 36 minutes ago        N/A 3 to 100%: High Risk   2 to < 3%: Medium Risk   0 to < 2%: Low Risk     Last Change: N/A          This score determines the patient's risk of having a stroke if the patient has atrial fibrillation.        This score is not applicable to this patient. Components are not calculated.          Revised Cardiac Risk Index    No data to  display       Apfel Simplified Score    No data to display       Risk Analysis Index Results This Encounter    No data found in the last 1 encounters.         Nurse Plan of Action:     .mrsa

## 2024-07-25 LAB — BACTERIA UR CULT: NORMAL

## 2024-07-26 LAB — STAPHYLOCOCCUS SPEC CULT: NORMAL

## 2024-08-01 ENCOUNTER — TELEMEDICINE (OUTPATIENT)
Dept: OBSTETRICS AND GYNECOLOGY | Facility: CLINIC | Age: 74
End: 2024-08-01
Payer: MEDICARE

## 2024-08-01 ENCOUNTER — ANESTHESIA EVENT (OUTPATIENT)
Dept: OPERATING ROOM | Facility: HOSPITAL | Age: 74
End: 2024-08-01
Payer: MEDICARE

## 2024-08-01 DIAGNOSIS — N81.9 VAGINAL VAULT PROLAPSE: Primary | ICD-10-CM

## 2024-08-01 PROCEDURE — 99212 OFFICE O/P EST SF 10 MIN: CPT | Performed by: OBSTETRICS & GYNECOLOGY

## 2024-08-01 PROCEDURE — 1036F TOBACCO NON-USER: CPT | Performed by: OBSTETRICS & GYNECOLOGY

## 2024-08-01 NOTE — H&P
History Of Present Illness  Eula Ku is a 73 y.o. female with recurrent vaginal vault prolapse s/p SSLF presents for robotic sacrocolpopexy and anterior-posterior repair. '    She is s/p SSLF on 2023 which did not resolve her prolapse and it returned 1 week post surgery. Has been using a pessary.   S/p hysterectomy.      Past Medical History  Past Medical History:   Diagnosis Date    Benign paroxysmal vertigo, unspecified ear 2013    Benign paroxysmal positional vertigo    Hyperlipidemia     Hypertension     Hyponatremia     Hypothyroidism     Meniere's disease, unspecified ear     Meniere's disease    Migraine with aura, not intractable, without status migrainosus     Ocular migraine    Osteoporosis     Personal history of other venous thrombosis and embolism      d/t pregnancy    PONV (postoperative nausea and vomiting)     Radiculopathy, lumbar region 2017    Lumbar radiculopathy    Retinal vein occlusion (CMS-HCC)     Vaginal vault prolapse        Surgical History  Past Surgical History:   Procedure Laterality Date    APPENDECTOMY  2014    Appendectomy     SECTION, CLASSIC  2014     Section    OTHER SURGICAL HISTORY  10/08/2020    Colonoscopy    OTHER SURGICAL HISTORY  2014    Pyloromyotomy    SACROCOLPOPEXY      TOTAL ABDOMINAL HYSTERECTOMY W/ BILATERAL SALPINGOOPHORECTOMY  2014    Total Abdominal Hysterectomy With Removal Of Both Ovaries        Social History  She reports that she has never smoked. She has never used smokeless tobacco. She reports that she does not currently use alcohol. She reports that she does not use drugs.    Family History  Family History   Problem Relation Name Age of Onset    Cancer Mother      Cancer Father      Breast cancer Sister  45        Allergies  Hydrochlorothiazide, Iodine, Metaxalone, Shellfish containing products, Tetanus vaccines and toxoid, Amlodipine, and Penicillins    Review of Systems   All other  "systems reviewed and are negative.     Physical Exam  General: Well appearing, alert  HEENT: normocephalic, EOMI, clear sclera  Cardio: Warm and well perfused  Resp: breathing comfortably on room air  Abd: soft, nontender, nondistended  Neuro: grossly intact, no focal deficits  Extremities: full ROM, no calf tenderness  Psych: A&O x3, appropriate mood and affect     Last Recorded Vitals  Pulse 56, temperature 36.8 °C (98.2 °F), resp. rate 14, height 1.676 m (5' 6\"), weight 66.1 kg (145 lb 11.6 oz), SpO2 98%.    Relevant Results  7/24 labs:  - Hgb: 14, plt 280  - Cr 0.82     Assessment/Plan   Principal Problem:    Vaginal vault prolapse    73 y.o. female presents for surgical treatment of vaginal vault prolapse, refractory to prior surgery.  - proceed with robotic sacrocoplpopexy, anterior-posterior repair, cystoscopy as planned  - anticipate overnight obs admission     Discussed with Dr. Irene Johns MD  PGY-3, Obstetrics and Gynecology      "

## 2024-08-01 NOTE — PROGRESS NOTES
Urogynecology  Provider:  Lary Bonilla MD  [phone]        ASSESSMENT AND PLAN:   73 y.o. female with vaginal vault prolapse. Comorbidities include: GERD, HLD, hypothyroidism, and HTN.           Diagnoses:   #1 Vaginal vault prolapse     Plan:   1. Vaginal vault prolapse, recurrence after previous SSLF surgery   - Patient can go home same day and doesn't have to stay overnight after surgery. Surgery will take about 2-2.5 hours. She will wake with a Wade and undergo a void trial. Reviewed less than 5% chance of urinary retention and going home with a Wade.   - The plan is for the RSCPXY, possible APR/perineorrhaphy, and cystoscopy on 8/2/24.   - UDS confirmed no LUIS ALBERTO.      Follow-up with Dr. Bonilla on 8/2/24 for surgery.     Phone call visit today: The patient's identity was confirmed and that she is located in [place]. Dr. Bonilla identified herself and the patient consented to a telehealth visit today. Phone call time: 5 minutes    Scribe Attestation:   I, Bhavna Schultz, am scribing for virtually, and in the presence of Lary Bonilla MD on 8/1/24 at 5:43 PM.     Agree with above. I Dr. Bonilla, personally performed the services described in the documentation which was scribed virtually and confirm it is both complete and accurate.  Lary Bonilla MD        Problem List Items Addressed This Visit    None          I spent a total of 10 minutes in face to face and non face to face time at this virtual visit.          Lary Bonilla MD        HISTORY OF PRESENT ILLNESS:   Eula Ku is a 73 y.o. female who presents to discuss her upcoming surgery.       Record Review:   - Last visit 7/1/2024  For OR 8/2/24  73-year-old female being assessed for vaginal vault prolapse and presenting to discuss definitive surgical management of the prolapse.   Vaginal vault prolapse  - Significant prolapse recurred despite previous SSLF surgery.  - Discussed the option of a more invasive robotic sacrocolpopexy  with mesh, which has a 90-98% success rate.  - She is seeking definitive surgical management.  - She will undergo robotic sacrocolpopexy with anterior-posterior repair.  - She had UDS testing prior to her surgery with Dr. Grubbs, so this will not be repeated.  - She will spend one night in the hospital post-surgery.    Test results:   - 2023 UDS results  Uroflow: interrupted pattern but low volume void < 100  CMG: normal compliance, normal sensation, isolated possible episode of DO at ~300 ml, negative LUIS ALBERTO  Voiding: normal pattern and mechanism of void  MUCP 25    Interval History:  - She prefers to not spend the night in the hospital.   - Last time, she did go home with a Wade and it stayed in place x4 days.      For surgery tomorrow. All questions answered.    Past Medical History:       Past Medical History:   Diagnosis Date    Benign paroxysmal vertigo, unspecified ear 2013    Benign paroxysmal positional vertigo    Hyperlipidemia     Hypertension     Hyponatremia     Hypothyroidism     Meniere's disease, unspecified ear     Meniere's disease    Migraine with aura, not intractable, without status migrainosus     Ocular migraine    Osteoporosis     Personal history of other venous thrombosis and embolism      d/t pregnancy    PONV (postoperative nausea and vomiting)     Radiculopathy, lumbar region 2017    Lumbar radiculopathy    Retinal vein occlusion (CMS-HCC)     Vaginal vault prolapse           Past Surgical History:       Past Surgical History:   Procedure Laterality Date    APPENDECTOMY  2014    Appendectomy     SECTION, CLASSIC  2014     Section    OTHER SURGICAL HISTORY  10/08/2020    Colonoscopy    OTHER SURGICAL HISTORY  2014    Pyloromyotomy    SACROCOLPOPEXY      TOTAL ABDOMINAL HYSTERECTOMY W/ BILATERAL SALPINGOOPHORECTOMY  2014    Total Abdominal Hysterectomy With Removal Of Both Ovaries         Medications:       Prior to Admission  medications    Medication Sig Start Date End Date Taking? Authorizing Provider   atorvastatin (Lipitor) 10 mg tablet Take 1 tablet (10 mg) by mouth once daily. 5/23/24 5/23/25  Juan Antonio Mclain MD   cetirizine (ZyrTEC) 10 mg tablet Take 1 tablet (10 mg) by mouth once daily. 5/26/17   Historical Provider, MD   chlorhexidine (Peridex) 0.12 % solution Use 15 mL in the mouth or throat 2 times a day. Use night before surgery and morning of surgery Swish and spit 7/24/24   Shonna Babcock, APRN-CNP   cholecalciferol (Vitamin D-3) 25 MCG (1000 UT) tablet Take 3 tablets (75 mcg) by mouth once daily.    Historical Provider, MD   estrogens, conjugated, (Premarin) vaginal cream Insert/use 0.5 grams twice weekly or as directed 5/23/24   Juan Antonio Mclain MD   ibandronate (Boniva) 150 mg tablet Take 1 tablet (150 mg) by mouth every 30 (thirty) days. Take in morning with full glass of water on an empty stomach. No food, drink, meds, or lying down for 60 minutes after. 5/23/24 5/23/25  Juan Antonio Mclain MD   levothyroxine (Synthroid, Levoxyl) 88 mcg tablet Take 1 tablet (88 mcg) by mouth once daily. 5/23/24 5/23/25  Juan Antonio Mclain MD   lisinopril 40 mg tablet Take 1 tablet (40 mg) by mouth once daily. 5/23/24 5/23/25  Juan Antonio Mclain MD   mv-min/iron/folic/calcium/vitK (WOMEN'S MULTIVITAMIN ORAL) Take 1 tablet by mouth once daily. 5/17/18   Historical Provider, MD   pantoprazole (ProtoNix) 40 mg EC tablet Take 1 tablet (40 mg) by mouth once daily in the morning. Take before meals. 5/23/24   Juan Antonio Mclain MD            Data and DIAGNOSTIC STUDIES REVIEWED   ECG 12 lead (Clinic Performed)    Result Date: 7/24/2024  Normal sinus rhythm Septal infarct , age undetermined Abnormal ECG When compared with ECG of 24-FEB-2023 09:03, Septal infarct is now Present Confirmed by Flakito Santana ([id number]) on 7/24/2024 2:49:10 PM     Lab Results   Component Value Date    URINECULTURE No significant growth 07/24/2024      Lab Results   Component  Value Date    GLUCOSE 98 07/24/2024    CALCIUM 8.9 07/24/2024     07/24/2024    K 4.2 07/24/2024    CO2 26 07/24/2024     07/24/2024    BUN 14 07/24/2024    CREATININE 0.82 07/24/2024     Lab Results   Component Value Date    WBC 7.3 07/24/2024    HGB 14.0 07/24/2024    HCT 42.6 07/24/2024    MCV 91 07/24/2024     07/24/2024

## 2024-08-01 NOTE — ANESTHESIA PREPROCEDURE EVALUATION
Patient: Eula Ku    Procedure Information       Date/Time: 24 1808    Procedures:       Robot Assisted Sacrocolpoplexy      Anterior & Posterior Repair; Possible Perineorrhaphy      Cystoscopy    Location: [facility] A OR 08 /  [facility] A OR    Surgeons: Lary Bonilla MD            Relevant Problems   Cardiac   (+) Hyperlipidemia   (+) Hypertension      GI   (+) GERD (gastroesophageal reflux disease)      /Renal   (+) UTI (urinary tract infection)      Endocrine   (+) Hypothyroidism      Musculoskeletal   (+) Chronic bilateral low back pain with right-sided sciatica   (+) Primary osteoarthritis of left knee      HEENT   (+) Seasonal allergies      ID   (+) UTI (urinary tract infection)      GYN   (+) Excessive or frequent menstruation   (+) Leiomyoma of uterus, unspecified       Clinical information reviewed:                    Past Medical History:   Diagnosis Date    Benign paroxysmal vertigo, unspecified ear 2013    Benign paroxysmal positional vertigo    Hyperlipidemia     Hypertension     Hyponatremia     Hypothyroidism     Meniere's disease, unspecified ear     Meniere's disease    Migraine with aura, not intractable, without status migrainosus     Ocular migraine    Osteoporosis     Personal history of other venous thrombosis and embolism      d/t pregnancy    PONV (postoperative nausea and vomiting)     Radiculopathy, lumbar region 2017    Lumbar radiculopathy    Retinal vein occlusion (CMS-HCC)     Vaginal vault prolapse       Past Surgical History:   Procedure Laterality Date    APPENDECTOMY  2014    Appendectomy     SECTION, CLASSIC  2014     Section    OTHER SURGICAL HISTORY  10/08/2020    Colonoscopy    OTHER SURGICAL HISTORY  2014    Pyloromyotomy    SACROCOLPOPEXY      TOTAL ABDOMINAL HYSTERECTOMY W/ BILATERAL SALPINGOOPHORECTOMY  2014    Total Abdominal Hysterectomy With Removal Of Both Ovaries     Social History     Tobacco  Use    Smoking status: Never    Smokeless tobacco: Never   Vaping Use    Vaping status: Never Used   Substance Use Topics    Alcohol use: Not Currently    Drug use: Never      Current Outpatient Medications   Medication Instructions    atorvastatin (LIPITOR) 10 mg, oral, Daily    cetirizine (ZYRTEC) 10 mg, oral, Daily    chlorhexidine (Peridex) 0.12 % solution 15 mL, Mouth/Throat, 2 times daily, Use night before surgery and morning of surgery Swish and spit    cholecalciferol (Vitamin D-3) 25 MCG (1000 UT) tablet 3 tablets, oral, Daily    estrogens, conjugated, (Premarin) vaginal cream Insert/use 0.5 grams twice weekly or as directed    ibandronate (BONIVA) 150 mg, oral, Every 30 days, Take in morning with full glass of water on an empty stomach. No food, drink, meds, or lying down for 60 minutes after.    levothyroxine (SYNTHROID, LEVOXYL) 88 mcg, oral, Daily    lisinopril 40 mg, oral, Daily    mv-min/iron/folic/calcium/vitK (WOMEN'S MULTIVITAMIN ORAL) 1 tablet, oral, Daily    pantoprazole (PROTONIX) 40 mg, oral, Daily before breakfast      Allergies   Allergen Reactions    Hydrochlorothiazide Other     hyponatremia    Iodine Unknown    Metaxalone Unknown    Shellfish Containing Products Swelling    Tetanus Vaccines And Toxoid Diarrhea and Other    Amlodipine Other     Lower extremity edema    Penicillins Hives and Rash        Chemistry    Lab Results   Component Value Date/Time     07/24/2024 0956    K 4.2 07/24/2024 0956     07/24/2024 0956    CO2 26 07/24/2024 0956    BUN 14 07/24/2024 0956    CREATININE 0.82 07/24/2024 0956    Lab Results   Component Value Date/Time    CALCIUM 8.9 07/24/2024 0956    ALKPHOS 75 05/10/2024 0841    AST 19 05/10/2024 0841    ALT 17 05/10/2024 0841    BILITOT 0.6 05/10/2024 0841          Lab Results   Component Value Date    HGBA1C 5.6 05/10/2024     Lab Results   Component Value Date/Time    WBC 7.3 07/24/2024 0956    HGB 14.0 07/24/2024 0956    HCT 42.6 07/24/2024 0956  "    07/24/2024 0956     No results found for: \"PROTIME\", \"PTT\", \"INR\"  No results found for: \"ABORH\"  Encounter Date: 07/24/24   ECG 12 lead (Clinic Performed)   Result Value    Ventricular Rate 62    Atrial Rate 62    FL Interval 180    QRS Duration 74    QT Interval 400    QTC Calculation(Bazett) 406    P Axis 61    R Axis -7    T Axis 59    QRS Count 10    Q Onset 228    P Onset 138    P Offset 184    T Offset 428    QTC Fredericia 404    Narrative    Normal sinus rhythm  Septal infarct , age undetermined  Abnormal ECG  When compared with ECG of 24-FEB-2023 09:03,  Septal infarct is now Present  Confirmed by Flakito Santana ([id number]) on 7/24/2024 2:49:10 PM     No results found for this or any previous visit from the past 1095 days.       Visit Vitals  LMP  (LMP Unknown)   OB Status Hysterectomy   Smoking Status Never     No data recorded     Physical Exam    Airway  Mallampati: II  TM distance: >3 FB  Neck ROM: full     Cardiovascular - normal exam     Dental - normal exam     Pulmonary - normal exam     Abdominal - normal exam              Anesthesia Plan    History of general anesthesia?: yes  History of complications of general anesthesia?: yes    ASA 2     general     The patient is not a current smoker.    intravenous induction   Postoperative administration of opioids is intended.  Anesthetic plan and risks discussed with patient.  Use of blood products discussed with patient who.    Plan discussed with CAA.        "

## 2024-08-02 ENCOUNTER — HOSPITAL ENCOUNTER (OUTPATIENT)
Facility: HOSPITAL | Age: 74
Setting detail: OUTPATIENT SURGERY
Discharge: HOME | End: 2024-08-02
Attending: OBSTETRICS & GYNECOLOGY | Admitting: OBSTETRICS & GYNECOLOGY
Payer: MEDICARE

## 2024-08-02 ENCOUNTER — ANESTHESIA (OUTPATIENT)
Dept: OPERATING ROOM | Facility: HOSPITAL | Age: 74
End: 2024-08-02
Payer: MEDICARE

## 2024-08-02 VITALS
RESPIRATION RATE: 14 BRPM | TEMPERATURE: 96.8 F | BODY MASS INDEX: 23.42 KG/M2 | SYSTOLIC BLOOD PRESSURE: 143 MMHG | HEART RATE: 71 BPM | HEIGHT: 66 IN | DIASTOLIC BLOOD PRESSURE: 70 MMHG | OXYGEN SATURATION: 94 % | WEIGHT: 145.72 LBS

## 2024-08-02 DIAGNOSIS — Z98.890 POSTOPERATIVE STATE: ICD-10-CM

## 2024-08-02 DIAGNOSIS — N81.9 VAGINAL VAULT PROLAPSE: Primary | ICD-10-CM

## 2024-08-02 LAB
ABO GROUP (TYPE) IN BLOOD: NORMAL
RH FACTOR (ANTIGEN D): NORMAL

## 2024-08-02 PROCEDURE — 7100000001 HC RECOVERY ROOM TIME - INITIAL BASE CHARGE: Performed by: OBSTETRICS & GYNECOLOGY

## 2024-08-02 PROCEDURE — 57425 LAPAROSCOPY SURG COLPOPEXY: CPT | Performed by: OBSTETRICS & GYNECOLOGY

## 2024-08-02 PROCEDURE — 96372 THER/PROPH/DIAG INJ SC/IM: CPT

## 2024-08-02 PROCEDURE — 7100000009 HC PHASE TWO TIME - INITIAL BASE CHARGE: Performed by: OBSTETRICS & GYNECOLOGY

## 2024-08-02 PROCEDURE — 2500000005 HC RX 250 GENERAL PHARMACY W/O HCPCS

## 2024-08-02 PROCEDURE — 3600000004 HC OR TIME - INITIAL BASE CHARGE - PROCEDURE LEVEL FOUR: Performed by: OBSTETRICS & GYNECOLOGY

## 2024-08-02 PROCEDURE — 96372 THER/PROPH/DIAG INJ SC/IM: CPT | Mod: 59 | Performed by: PHARMACIST

## 2024-08-02 PROCEDURE — 2500000004 HC RX 250 GENERAL PHARMACY W/ HCPCS (ALT 636 FOR OP/ED): Performed by: ANESTHESIOLOGY

## 2024-08-02 PROCEDURE — 7100000002 HC RECOVERY ROOM TIME - EACH INCREMENTAL 1 MINUTE: Performed by: OBSTETRICS & GYNECOLOGY

## 2024-08-02 PROCEDURE — 2720000007 HC OR 272 NO HCPCS: Performed by: OBSTETRICS & GYNECOLOGY

## 2024-08-02 PROCEDURE — 2500000001 HC RX 250 WO HCPCS SELF ADMINISTERED DRUGS (ALT 637 FOR MEDICARE OP): Performed by: OBSTETRICS & GYNECOLOGY

## 2024-08-02 PROCEDURE — 2500000002 HC RX 250 W HCPCS SELF ADMINISTERED DRUGS (ALT 637 FOR MEDICARE OP, ALT 636 FOR OP/ED): Performed by: ANESTHESIOLOGY

## 2024-08-02 PROCEDURE — 2780000003 HC OR 278 NO HCPCS: Performed by: OBSTETRICS & GYNECOLOGY

## 2024-08-02 PROCEDURE — 2500000004 HC RX 250 GENERAL PHARMACY W/ HCPCS (ALT 636 FOR OP/ED)

## 2024-08-02 PROCEDURE — 7100000010 HC PHASE TWO TIME - EACH INCREMENTAL 1 MINUTE: Performed by: OBSTETRICS & GYNECOLOGY

## 2024-08-02 PROCEDURE — 3700000002 HC GENERAL ANESTHESIA TIME - EACH INCREMENTAL 1 MINUTE: Performed by: OBSTETRICS & GYNECOLOGY

## 2024-08-02 PROCEDURE — 2500000005 HC RX 250 GENERAL PHARMACY W/O HCPCS: Performed by: ANESTHESIOLOGY

## 2024-08-02 PROCEDURE — A57425 PR LAPAROSCOPY, SURG, COLPOPEXY: Performed by: ANESTHESIOLOGY

## 2024-08-02 PROCEDURE — 3600000009 HC OR TIME - EACH INCREMENTAL 1 MINUTE - PROCEDURE LEVEL FOUR: Performed by: OBSTETRICS & GYNECOLOGY

## 2024-08-02 PROCEDURE — A57425 PR LAPAROSCOPY, SURG, COLPOPEXY

## 2024-08-02 PROCEDURE — 2500000004 HC RX 250 GENERAL PHARMACY W/ HCPCS (ALT 636 FOR OP/ED): Performed by: OBSTETRICS & GYNECOLOGY

## 2024-08-02 PROCEDURE — 2500000004 HC RX 250 GENERAL PHARMACY W/ HCPCS (ALT 636 FOR OP/ED): Performed by: PHARMACIST

## 2024-08-02 PROCEDURE — 3700000001 HC GENERAL ANESTHESIA TIME - INITIAL BASE CHARGE: Performed by: OBSTETRICS & GYNECOLOGY

## 2024-08-02 PROCEDURE — 36415 COLL VENOUS BLD VENIPUNCTURE: CPT | Performed by: OBSTETRICS & GYNECOLOGY

## 2024-08-02 PROCEDURE — C1781 MESH (IMPLANTABLE): HCPCS | Performed by: OBSTETRICS & GYNECOLOGY

## 2024-08-02 PROCEDURE — 99100 ANES PT EXTEME AGE<1 YR&>70: CPT | Performed by: ANESTHESIOLOGY

## 2024-08-02 DEVICE — POLYPROPYLENE MESH FOR SACROCOLPOSUSPENSION/SACROCOLPOPEXY - Y
Type: IMPLANTABLE DEVICE | Site: PELVIS | Status: FUNCTIONAL
Brand: RESTORELLE

## 2024-08-02 RX ORDER — ACETAMINOPHEN 325 MG/1
975 TABLET ORAL ONCE
Status: COMPLETED | OUTPATIENT
Start: 2024-08-02 | End: 2024-08-02

## 2024-08-02 RX ORDER — IBUPROFEN 600 MG/1
600 TABLET ORAL EVERY 6 HOURS PRN
Qty: 20 TABLET | Refills: 0 | Status: SHIPPED | OUTPATIENT
Start: 2024-08-02

## 2024-08-02 RX ORDER — LIDOCAINE HYDROCHLORIDE 20 MG/ML
INJECTION, SOLUTION INFILTRATION; PERINEURAL AS NEEDED
Status: DISCONTINUED | OUTPATIENT
Start: 2024-08-02 | End: 2024-08-02

## 2024-08-02 RX ORDER — ONDANSETRON 4 MG/1
4 TABLET, FILM COATED ORAL EVERY 6 HOURS PRN
Qty: 10 TABLET | Refills: 0 | Status: SHIPPED | OUTPATIENT
Start: 2024-08-02 | End: 2024-08-05

## 2024-08-02 RX ORDER — ONDANSETRON HYDROCHLORIDE 2 MG/ML
INJECTION, SOLUTION INTRAVENOUS AS NEEDED
Status: DISCONTINUED | OUTPATIENT
Start: 2024-08-02 | End: 2024-08-02

## 2024-08-02 RX ORDER — APREPITANT 40 MG/1
40 CAPSULE ORAL ONCE
Status: COMPLETED | OUTPATIENT
Start: 2024-08-02 | End: 2024-08-02

## 2024-08-02 RX ORDER — BUPIVACAINE HYDROCHLORIDE 2.5 MG/ML
INJECTION, SOLUTION INFILTRATION; PERINEURAL AS NEEDED
Status: DISCONTINUED | OUTPATIENT
Start: 2024-08-02 | End: 2024-08-02 | Stop reason: HOSPADM

## 2024-08-02 RX ORDER — GABAPENTIN 300 MG/1
600 CAPSULE ORAL ONCE
Status: COMPLETED | OUTPATIENT
Start: 2024-08-02 | End: 2024-08-02

## 2024-08-02 RX ORDER — LIDOCAINE HYDROCHLORIDE 10 MG/ML
0.1 INJECTION, SOLUTION EPIDURAL; INFILTRATION; INTRACAUDAL; PERINEURAL ONCE
Status: DISCONTINUED | OUTPATIENT
Start: 2024-08-02 | End: 2024-08-02 | Stop reason: HOSPADM

## 2024-08-02 RX ORDER — PROPOFOL 10 MG/ML
INJECTION, EMULSION INTRAVENOUS AS NEEDED
Status: DISCONTINUED | OUTPATIENT
Start: 2024-08-02 | End: 2024-08-02

## 2024-08-02 RX ORDER — ROCURONIUM BROMIDE 10 MG/ML
INJECTION, SOLUTION INTRAVENOUS AS NEEDED
Status: DISCONTINUED | OUTPATIENT
Start: 2024-08-02 | End: 2024-08-02

## 2024-08-02 RX ORDER — CEFOXITIN 2 G/1
INJECTION, POWDER, FOR SOLUTION INTRAVENOUS AS NEEDED
Status: DISCONTINUED | OUTPATIENT
Start: 2024-08-02 | End: 2024-08-02

## 2024-08-02 RX ORDER — OXYCODONE HYDROCHLORIDE 5 MG/1
5 TABLET ORAL EVERY 4 HOURS PRN
Status: DISCONTINUED | OUTPATIENT
Start: 2024-08-02 | End: 2024-08-02 | Stop reason: HOSPADM

## 2024-08-02 RX ORDER — SODIUM CHLORIDE, SODIUM LACTATE, POTASSIUM CHLORIDE, CALCIUM CHLORIDE 600; 310; 30; 20 MG/100ML; MG/100ML; MG/100ML; MG/100ML
100 INJECTION, SOLUTION INTRAVENOUS CONTINUOUS
Status: DISCONTINUED | OUTPATIENT
Start: 2024-08-02 | End: 2024-08-02 | Stop reason: HOSPADM

## 2024-08-02 RX ORDER — PROMETHAZINE HYDROCHLORIDE 25 MG/ML
6.25 INJECTION, SOLUTION INTRAMUSCULAR; INTRAVENOUS ONCE AS NEEDED
Status: COMPLETED | OUTPATIENT
Start: 2024-08-02 | End: 2024-08-02

## 2024-08-02 RX ORDER — PHENAZOPYRIDINE HYDROCHLORIDE 100 MG/1
200 TABLET, FILM COATED ORAL ONCE
Status: COMPLETED | OUTPATIENT
Start: 2024-08-02 | End: 2024-08-02

## 2024-08-02 RX ORDER — PHENYLEPHRINE HCL IN 0.9% NACL 1 MG/10 ML
SYRINGE (ML) INTRAVENOUS AS NEEDED
Status: DISCONTINUED | OUTPATIENT
Start: 2024-08-02 | End: 2024-08-02

## 2024-08-02 RX ORDER — OXYCODONE HYDROCHLORIDE 5 MG/1
5 TABLET ORAL EVERY 6 HOURS PRN
Qty: 10 TABLET | Refills: 0 | Status: SHIPPED | OUTPATIENT
Start: 2024-08-02

## 2024-08-02 RX ORDER — ACETAMINOPHEN 325 MG/1
650 TABLET ORAL EVERY 4 HOURS PRN
Status: DISCONTINUED | OUTPATIENT
Start: 2024-08-02 | End: 2024-08-02 | Stop reason: HOSPADM

## 2024-08-02 RX ORDER — FENTANYL CITRATE 50 UG/ML
INJECTION, SOLUTION INTRAMUSCULAR; INTRAVENOUS AS NEEDED
Status: DISCONTINUED | OUTPATIENT
Start: 2024-08-02 | End: 2024-08-02

## 2024-08-02 RX ORDER — MIDAZOLAM HYDROCHLORIDE 1 MG/ML
INJECTION INTRAMUSCULAR; INTRAVENOUS AS NEEDED
Status: DISCONTINUED | OUTPATIENT
Start: 2024-08-02 | End: 2024-08-02

## 2024-08-02 RX ORDER — HEPARIN SODIUM 5000 [USP'U]/ML
5000 INJECTION, SOLUTION INTRAVENOUS; SUBCUTANEOUS ONCE
Status: COMPLETED | OUTPATIENT
Start: 2024-08-02 | End: 2024-08-02

## 2024-08-02 RX ORDER — ACETAMINOPHEN 325 MG/1
975 TABLET ORAL EVERY 6 HOURS PRN
Qty: 20 TABLET | Refills: 0 | Status: SHIPPED | OUTPATIENT
Start: 2024-08-02

## 2024-08-02 RX ORDER — NORETHINDRONE AND ETHINYL ESTRADIOL 0.5-0.035
KIT ORAL AS NEEDED
Status: DISCONTINUED | OUTPATIENT
Start: 2024-08-02 | End: 2024-08-02

## 2024-08-02 RX ORDER — CELECOXIB 200 MG/1
400 CAPSULE ORAL ONCE
Status: COMPLETED | OUTPATIENT
Start: 2024-08-02 | End: 2024-08-02

## 2024-08-02 RX ORDER — METRONIDAZOLE 500 MG/100ML
INJECTION, SOLUTION INTRAVENOUS AS NEEDED
Status: DISCONTINUED | OUTPATIENT
Start: 2024-08-02 | End: 2024-08-02

## 2024-08-02 SDOH — HEALTH STABILITY: MENTAL HEALTH: CURRENT SMOKER: 0

## 2024-08-02 ASSESSMENT — PAIN SCALES - GENERAL
PAINLEVEL_OUTOF10: 0 - NO PAIN
PAINLEVEL_OUTOF10: 3
PAINLEVEL_OUTOF10: 4
PAINLEVEL_OUTOF10: 5 - MODERATE PAIN
PAINLEVEL_OUTOF10: 0 - NO PAIN
PAINLEVEL_OUTOF10: 0 - NO PAIN
PAINLEVEL_OUTOF10: 4
PAINLEVEL_OUTOF10: 0 - NO PAIN
PAINLEVEL_OUTOF10: 3

## 2024-08-02 ASSESSMENT — PAIN - FUNCTIONAL ASSESSMENT
PAIN_FUNCTIONAL_ASSESSMENT: 0-10
PAIN_FUNCTIONAL_ASSESSMENT: VAS (VISUAL ANALOG SCALE)
PAIN_FUNCTIONAL_ASSESSMENT: 0-10

## 2024-08-02 ASSESSMENT — COLUMBIA-SUICIDE SEVERITY RATING SCALE - C-SSRS
1. IN THE PAST MONTH, HAVE YOU WISHED YOU WERE DEAD OR WISHED YOU COULD GO TO SLEEP AND NOT WAKE UP?: NO
6. HAVE YOU EVER DONE ANYTHING, STARTED TO DO ANYTHING, OR PREPARED TO DO ANYTHING TO END YOUR LIFE?: NO
2. HAVE YOU ACTUALLY HAD ANY THOUGHTS OF KILLING YOURSELF?: NO

## 2024-08-02 ASSESSMENT — PAIN DESCRIPTION - LOCATION: LOCATION: ABDOMEN

## 2024-08-02 ASSESSMENT — PAIN DESCRIPTION - ORIENTATION: ORIENTATION: MID

## 2024-08-02 NOTE — ANESTHESIA PROCEDURE NOTES
Airway  Date/Time: 8/2/2024 7:59 AM  Urgency: elective    Airway not difficult    Staffing  Performed: HAIDER   Authorized by: Afshin Segovia MD    Performed by: HAIDER Guzman  Patient location during procedure: OR    Indications and Patient Condition  Indications for airway management: anesthesia  Spontaneous ventilation: present  Preoxygenated: yes  Patient position: sniffing  MILS maintained throughout    Planned trial extubation    Final Airway Details  Final airway type: endotracheal airway      Successful airway: ETT  Cuffed: yes   Successful intubation technique: direct laryngoscopy  Facilitating devices/methods: intubating stylet  Endotracheal tube insertion site: oral  Blade: Jermain  Blade size: #3  ETT size (mm): 7.0  Cormack-Lehane Classification: grade I - full view of glottis  Placement verified by: chest auscultation and capnometry   Measured from: lips  Number of attempts at approach: 1

## 2024-08-02 NOTE — OP NOTE
Date: 2024  OR Location: [facility] OR    Name: Eula Ku, : 1950, Age: 73 y.o., MRN: [id number], Sex: female    Diagnosis  Pre-op Diagnosis      * Vaginal vault prolapse [N81.9] Post-op Diagnosis     * Vaginal vault prolapse [N81.9]     Procedures  Robotic sacrocolpopexy  Lysis of adhesions  cystoscopy      Surgeons   Panel 1:     * Lary Bonilla - Primary  Panel 2:     * Lary Bonilla - Primary    Resident/Fellow/Other Assistant:  Surgeons and Role:  * No surgeons found with a matching role *    SHABBIR Johns MD    Procedure Summary  Anesthesia: General  ASA: II  Anesthesia Staff: Anesthesiologist: Afshin Segovia MD  CRNA: DESIREE Mckenzie-CRNA  C-AA: HAIDER Guzman  Estimated Blood Loss: 20mL  Intra-op Medications:   Administrations occurring from 0730 to 1030 on 24:   Medication Name Total Dose   heparin (porcine) injection 5,000 Units 5,000 Units              Anesthesia Record               Intraprocedure I/O Totals       None           Specimen: No specimens collected     Staff:   Circulator: Reanna Steinerub Person: Miles Del Toro Circulator: Claudia Del Toro Scrub: Hawa    Findings: moderate pelvic and abdominal adhesions, normal bladder obn cysto with bilateral ureteral efflux     Procedure Details:  The patient was seen in the preoperative area. The site of surgery was properly noted/marked if necessary per policy. The patient has been actively warmed in preoperative area. Preoperative antibiotics have been ordered and given within 1 hours of incision. Venous thrombosis prophylaxis have been ordered including bilateral sequential compression devices. She was given subcutaneous heparin x 1 dose in pre-op holding.    The patient was interviewed in the pre-op holding area by the surgical team, where the risks, benefits, and indication of the procedure were reviewed.  She was then transferred to the operating suite where the patient was properly identified and the procedure was  confirmed. When adequate anesthesia was obtained, the patient was prepped and draped in a dorsal lithotomy position utilizing yellow fin stirrups, the pink pad positioning device and purple arm protectors. A time-out was performed. Preoperative antibiotics were given. A denney catheter was inserted under sterile conditions. A EEA sizer was placed in the vagina.    We then turned our attention to the abdomen.The abdominal was enetered in an open fashion and a Santos port inserted and insufflation was performed. Pneumoperitoneum was obtained. We then directly entered the peritoneal cavity with an 8 mm robotic trochar. The remainder of the trochars were then placed, three 8 mm robotic trochars, and one 12 mm assistant trochar. A robot port was placed in the Santos port. A significant omental adhesions to the anterior abdominal wall was noted and taken down with blunt and bovie cautery laparoscopically.     The patient was then placed in steep Trendelenburg position, and the above findings were noted. We began the procedure by docking the robot in the standard fashion and inserting the robotic instruments.    The anterior vagina was dissected from the bladder with a combination of sharp, blunt, and cautery dissection. No dissection was performed on the posterior vagina. The Y mesh was sutured to the vagina both anteriorly and posteriorly with a series of interrupted 0 Gortex sutures.    Next, the peritoneum overlying the sacrum was incised with the monopolar scissors, exposing the periosteum. A large middle sacral artery was noted and cauterized multiple times. A figure of eight suture was placed in the anterior longitudinal ligament and was cut to create four ends. The ends were brought through the mesh and tied down such that it elevated the vaginal apex without undue tension resulting in 2 sutures attaching the mesh to the anterior sacral ligament. An additional transverse suture was placed though the ligament and  mesh and tied.     The peritoneum overlying the sacrum was then closed with a running 0-Vicryl suture, and the bladder peritoneum was brought down to meet the sacral peritoneum, resulting in complete reperitonealization of the mesh graft.    The 12 mm port was then removed and the fascia was closed with the Antolin John device and a tie of 0-Vicryl. All of the remaining ports were then removed. The umbilical incision was extended slightly and the uterus was grasped with an Allis and brought to the skin. An Abiel retractor was placed and the uterus was morcellated out of the body. The fascia of the umbilical port site was then closed with a running 0-Vicryl stitch and the skin of the umbilical port and all the other ports was closed with 3-0 Monocryl. All incisions were covered with Dermabond.    Cystoscopy revealed an intact bladder with bilateral normal ureteral efflux and no foreign body in the bladder.    The Lonestar retractor was placed around the perineum. A few through and through sutures sites were bleeding and oversewed with figure-of-eight 3-0 monocryl stitches for good hemostasis.   She had good vaginal support and no further repair was needed.     The patient was then awakened from anesthesia, having tolerated procedure well, and was taken to the recovery room in stable condition. All sponge, needle, and instrument counts were correct at the end the case.    Dr. Bonilla was present for all of the procedure.     Complications:  None; patient tolerated the procedure well.     Disposition: PACU - hemodynamically stable.  Condition: stable

## 2024-08-02 NOTE — POST-PROCEDURE NOTE
1255: pt up to restroom with assistance  1312: pt able urinate  1313: pt dressed with assistance  1317: Family at bedside  1325: Discharge instructions discussed with patient and family at this time verbalized understanding   1327: Family sent for vehicle  1330: IV removed, pt tolerated well. Catheter intact  1331: Patient Discharged in stable condition via wheelchair to [facility] by komal hylton

## 2024-08-02 NOTE — PERIOPERATIVE NURSING NOTE
1105 Pt arrived to pacu bay 50 at this time, report received from OR and anesthesia staff. Phase 1 care started. Assuming care of pt at this time. Bedside report received from outgoing RN. Message sent to family via text at this time.   1107 o2 removed   1115   1128 Dilaudid 0.5mg given   1130 emesis x 1, o2 at 3l applied  1132 phenergan 6.25mg IM given   1145 o2 removed   1200 no assessment changes   1215  voiding trial completed, [id number] void, Pt meets discharge criteria from phase 1 at this time

## 2024-08-02 NOTE — DISCHARGE INSTRUCTIONS
Call Dr. Bonilla for any problems and/or concerns at [phone]    *Walk as much as possible, it is ok to use stairs carefully  *Continue the coughing and deep breathing exercises that your learned in the hospital  *No lifting/straining greater than 10 pounds for 6 weeks (Avoid strenuous activity)  *Vaginal rest for 6 weeks (Do not put anything in your vagina. Do not use tampons or douches. Do not have sex until cleared by physician. If you were prescribed vaginal estrogen cream please use as directed)  *Prevent constipation by using stool softeners and staying hydrated, so that you do not strain against your stitches or have pain from constipation    Call Doctor right away for:    *Fever above 100.4/shaking chills  *Bright red vaginal bleeding or bleeding that soaks more than one sanitary pad per hour  *A foul smelling discharge from the vagina  *Trouble urinating or burning with urination  *Severe pain or bloating in your abdomen  *Persistent nausea/vomiting  *Redness, swelling, or drainage at your incision sites  *Chest pain/shortness of breath-call [phone].    - You will be going home with prescriptions for pain medication. If you are able to take them, alternate a dose of Acetaminophen (Tylenol) and Ibuprofen (Motrin) every 3 hours. (For example, 1000mg of Tylenol at 09:00am, 600mg ibuprofen at 12:00pm, 1000mg of Tylenol at 3:00pm, 600mg ibuprofen at 6:00pm). Use any prescribed narcotic (ie oxycodone) for breakthrough pain as prescribed. Use a stool softener, such as Miralax, to prevent constipation from the narcotic medication. If you are going home with a prescription for estrogen cream, use vaginally as prescribed nightly until your 6 week post-op visit.'

## 2024-08-03 ENCOUNTER — TELEPHONE (OUTPATIENT)
Dept: OBSTETRICS AND GYNECOLOGY | Facility: CLINIC | Age: 74
End: 2024-08-03
Payer: MEDICARE

## 2024-08-03 NOTE — TELEPHONE ENCOUNTER
Call to patient for follow up from surgery yesterday. She states that she is doing well. She is up and about; eating and drinking; mainly using tylenol and ibuprofen for pain control. No bowel movement yet. Discussed using miralax in AM and PM. Asked if she is using estrogen cream at bedtime. She said that she is not aware of being instructed for that. I explained pea size amount just inside the tip of vagina every night at bedtime, until 6 week appointment ( or Dr Bonilla's at post op appointment ).  Will check back with her tomorrow.

## 2024-08-04 ENCOUNTER — TELEPHONE (OUTPATIENT)
Dept: OBSTETRICS AND GYNECOLOGY | Facility: CLINIC | Age: 74
End: 2024-08-04
Payer: MEDICARE

## 2024-08-04 NOTE — TELEPHONE ENCOUNTER
Call to patient for follow up from surgery. Moving around well. States that she is passing gas; but no bowel movement. Told to take the miralax twice daily; she said that she hasn't taken any, so far. Discussed that she needs to do it twice a day now. Also suggested smooth move tea. She asked about heating pad for incision sites, and I told her that may provide her some relief from pain. Only took narcotic pain medication, once. Told to call me back for questions and concerns.

## 2024-08-05 RX ORDER — ONDANSETRON 4 MG/1
4 TABLET, FILM COATED ORAL EVERY 6 HOURS PRN
Qty: 10 TABLET | Refills: 0 | Status: SHIPPED | OUTPATIENT
Start: 2024-08-05

## 2024-08-06 NOTE — TELEPHONE ENCOUNTER
Pt was called  about prior auth for zofran but she states she does not want the zofran. Pharmacist notified

## 2024-08-11 NOTE — PROGRESS NOTES
Urogynecology  Provider:  Lary Bonilla MD  [phone]        ASSESSMENT AND PLAN:   73 y.o. female presenting in postop after surgery for VVP. Comorbidities include: GERD, HLD, hypothyroidism, and HTN.                            Diagnoses:   #1 Vaginal vault prolapse     Plan:   1. Vaginal vault prolapse, postop   - 8/2/24: Robot Assisted Sacrocolpoplexy, lysis of adhesions, Cystoscopy  - Use tv estrogen cream nightly until she is 6 weeks postop.   - She can soak in a tub now. No lifting more than 10 lbs. The Dermabond will come off on its own or she can use a washcloth.   - Healing well on exam.   - She is amenable to this and all questions were answered.      Follow-up in 1 month with Dr. Bonilla or Edelmira RAMÍREZ-POLLO.     Scribe Attestation:   I, Bhavna Schultz, am scribing for virtually, and in the presence of Lary Bonilla MD on 8/12/24 at 5:18 PM.     Agree with above. I Dr. Bonilla, personally performed the services described in the documentation which was scribed virtually and confirm it is both complete and accurate.  Lary Bonilla MD        Problem List Items Addressed This Visit    None          I spent a total of eConsult Time: 20 minutes in face to face and non face to face time.        Lary Bonilla MD        HISTORY OF PRESENT ILLNESS:   Eula Ku is a 73 y.o. female who presents in postoperative follow up.   She is overall feeling well and thanks me for doing a good job.     Record Review:   8/2/24  Robot Assisted Sacrocolpoplexy, lysis of adhesions   Cystoscopy        Postop Symptoms:  - She is doing well.   - Pt is taking Tylenol, which manages pain.   - She hasn't been using tv estrogen cream in the vagina.   - Denies urinary sxs.   - No LUIS ALBERTO.   - Voids normally.   - She gets up once at night to void.         Past Medical History:      Past Medical History:   Diagnosis Date    Benign paroxysmal vertigo, unspecified ear 12/11/2013    Benign paroxysmal  positional vertigo    Hyperlipidemia     Hypertension     Hyponatremia     Hypothyroidism     Meniere's disease, unspecified ear     Meniere's disease    Migraine with aura, not intractable, without status migrainosus     Ocular migraine    Osteoporosis     Personal history of other venous thrombosis and embolism      d/t pregnancy    PONV (postoperative nausea and vomiting)     Radiculopathy, lumbar region 2017    Lumbar radiculopathy    Retinal vein occlusion (CMS-HCC)     Vaginal vault prolapse           Past Surgical History:       Past Surgical History:   Procedure Laterality Date    APPENDECTOMY  2014    Appendectomy     SECTION, CLASSIC  2014     Section    OTHER SURGICAL HISTORY  10/08/2020    Colonoscopy    OTHER SURGICAL HISTORY  2014    Pyloromyotomy    SACROCOLPOPEXY      TOTAL ABDOMINAL HYSTERECTOMY W/ BILATERAL SALPINGOOPHORECTOMY  2014    Total Abdominal Hysterectomy With Removal Of Both Ovaries         Medications:       Prior to Admission medications    Medication Sig Start Date End Date Taking? Authorizing Provider   acetaminophen (Tylenol) 325 mg tablet Take 3 tablets (975 mg) by mouth every 6 hours if needed for mild pain (1 - 3) for up to 20 doses. Take alternating with Ibuprofen 24   Nano Johns MD   atorvastatin (Lipitor) 10 mg tablet Take 1 tablet (10 mg) by mouth once daily. 24  Juan Antonio Mclain MD   cetirizine (ZyrTEC) 10 mg tablet Take 1 tablet (10 mg) by mouth once daily. 17   Historical Provider, MD   chlorhexidine (Peridex) 0.12 % solution Use 15 mL in the mouth or throat 2 times a day. Use night before surgery and morning of surgery Swish and spit 24   Shonna Babcock, APRN-CNP   cholecalciferol (Vitamin D-3) 25 MCG (1000 UT) tablet Take 3 tablets (75 mcg) by mouth once daily.    Historical Provider, MD   estrogens, conjugated, (Premarin) vaginal cream Insert/use 0.5 grams twice weekly or as  directed 5/23/24   Juan Antonio Mclain MD   ibandronate (Boniva) 150 mg tablet Take 1 tablet (150 mg) by mouth every 30 (thirty) days. Take in morning with full glass of water on an empty stomach. No food, drink, meds, or lying down for 60 minutes after. 5/23/24 5/23/25  Juan Antonio Mclain MD   ibuprofen 600 mg tablet Take 1 tablet (600 mg) by mouth every 6 hours if needed for moderate pain (4 - 6) for up to 20 doses. Take alternating with tylenol 8/2/24   Nano Johns MD   levothyroxine (Synthroid, Levoxyl) 88 mcg tablet Take 1 tablet (88 mcg) by mouth once daily. 5/23/24 5/23/25  Juan Antonio Mclain MD   lisinopril 40 mg tablet Take 1 tablet (40 mg) by mouth once daily. 5/23/24 5/23/25  Juan Antonio Mclain MD   mv-min/iron/folic/calcium/vitK (WOMEN'S MULTIVITAMIN ORAL) Take 1 tablet by mouth once daily. 5/17/18   Historical MD Gurpreet   ondansetron (Zofran) 4 mg tablet TAKE 1 TABLET (4 MG) BY MOUTH EVERY 6 HOURS IF NEEDED FOR NAUSEA FOR UP TO 10 DOSES. 8/5/24   Nano Johns MD   oxyCODONE (Roxicodone) 5 mg immediate release tablet Take 1 tablet (5 mg) by mouth every 6 hours if needed for severe pain (7 - 10) (for pain not controlled by tylenol and ibuprofen.) for up to 10 doses. Take with stool softener 8/2/24   Nano Johns MD   pantoprazole (ProtoNix) 40 mg EC tablet Take 1 tablet (40 mg) by mouth once daily in the morning. Take before meals. 5/23/24   Juan Antonio Mclain MD   ondansetron (Zofran) 4 mg tablet Take 1 tablet (4 mg) by mouth every 6 hours if needed for nausea for up to 10 doses. 8/2/24 8/5/24  Nano Johns MD        ROS  Review of Systems     Blood, Urine   Date Value Ref Range Status   03/28/2023 NEGATIVE NEGATIVE Final     Nitrite, Urine   Date Value Ref Range Status   03/28/2023 NEGATIVE NEGATIVE Final     Urobilinogen, Urine   Date Value Ref Range Status   03/28/2023 <2.0 0.0 - 1.9 mg/dL Final         PHYSICAL EXAM:      LMP  (LMP Unknown)      No LMP recorded (lmp unknown). Patient has had a  hysterectomy.      Declines chaperone for physical exam.      Well developed, well nourished, in no apparent distress.   Neurologic/Psychiatric:  Awake, Alert and Oriented times 3.  Affect normal.     Abdomen: Well healed abdominal incisions.     GENITAL/URINARY:       External Genitalia:  The patient has normal appearing external genitalia, normal skenes and bartholins glands, and a normal hair distribution.  Her vulva is without lesions, erythema or discharge.  It is non-tender with appropriate sensation.     Urethral Meatus:  Size normal, Location normal, Lesions absent, Prolapse absent,      Urethra:  Fullness absent, Masses absent,      Bladder:  Fullness absent, Masses absent, Tenderness absent,      Vagina:  General appearance normal, Lesions absent, no abnormalities, well healing, no mesh erosion, no prolapse     Cervix: absent   Uterus:  surgically absent     Anus/Perineum:  normal rectal exam        Data and DIAGNOSTIC STUDIES REVIEWED   ECG 12 lead (Clinic Performed)    Result Date: 7/24/2024  Normal sinus rhythm Septal infarct , age undetermined Abnormal ECG When compared with ECG of 24-FEB-2023 09:03, Septal infarct is now Present Confirmed by Flakito Santana ([id number]) on 7/24/2024 2:49:10 PM     Lab Results   Component Value Date    URINECULTURE No significant growth 07/24/2024      Lab Results   Component Value Date    GLUCOSE 98 07/24/2024    CALCIUM 8.9 07/24/2024     07/24/2024    K 4.2 07/24/2024    CO2 26 07/24/2024     07/24/2024    BUN 14 07/24/2024    CREATININE 0.82 07/24/2024     Lab Results   Component Value Date    WBC 7.3 07/24/2024    HGB 14.0 07/24/2024    HCT 42.6 07/24/2024    MCV 91 07/24/2024     07/24/2024          Lary Bonilla MD

## 2024-08-12 ENCOUNTER — OFFICE VISIT (OUTPATIENT)
Dept: OBSTETRICS AND GYNECOLOGY | Facility: CLINIC | Age: 74
End: 2024-08-12
Payer: MEDICARE

## 2024-08-12 VITALS
HEIGHT: 66 IN | BODY MASS INDEX: 24.11 KG/M2 | WEIGHT: 150 LBS | HEART RATE: 79 BPM | SYSTOLIC BLOOD PRESSURE: 137 MMHG | DIASTOLIC BLOOD PRESSURE: 77 MMHG

## 2024-08-12 DIAGNOSIS — N81.9 VAGINAL VAULT PROLAPSE: Primary | ICD-10-CM

## 2024-08-12 LAB
POC APPEARANCE, URINE: CLEAR
POC BILIRUBIN, URINE: NEGATIVE
POC BLOOD, URINE: ABNORMAL
POC COLOR, URINE: YELLOW
POC GLUCOSE, URINE: NEGATIVE MG/DL
POC KETONES, URINE: NEGATIVE MG/DL
POC LEUKOCYTES, URINE: NEGATIVE
POC NITRITE,URINE: NEGATIVE
POC PH, URINE: 6.5 PH
POC PROTEIN, URINE: NEGATIVE MG/DL
POC SPECIFIC GRAVITY, URINE: 1.01
POC UROBILINOGEN, URINE: 0.2 EU/DL

## 2024-08-12 PROCEDURE — 99211 OFF/OP EST MAY X REQ PHY/QHP: CPT | Performed by: OBSTETRICS & GYNECOLOGY

## 2024-08-12 PROCEDURE — 3008F BODY MASS INDEX DOCD: CPT | Performed by: OBSTETRICS & GYNECOLOGY

## 2024-08-12 PROCEDURE — 3075F SYST BP GE 130 - 139MM HG: CPT | Performed by: OBSTETRICS & GYNECOLOGY

## 2024-08-12 PROCEDURE — 3078F DIAST BP <80 MM HG: CPT | Performed by: OBSTETRICS & GYNECOLOGY

## 2024-08-12 PROCEDURE — 1159F MED LIST DOCD IN RCRD: CPT | Performed by: OBSTETRICS & GYNECOLOGY

## 2024-08-12 PROCEDURE — 1126F AMNT PAIN NOTED NONE PRSNT: CPT | Performed by: OBSTETRICS & GYNECOLOGY

## 2024-08-12 PROCEDURE — 81003 URINALYSIS AUTO W/O SCOPE: CPT | Performed by: OBSTETRICS & GYNECOLOGY

## 2024-08-12 ASSESSMENT — PAIN SCALES - GENERAL: PAINLEVEL: 0-NO PAIN

## 2024-09-05 ENCOUNTER — OFFICE VISIT (OUTPATIENT)
Dept: OBSTETRICS AND GYNECOLOGY | Facility: CLINIC | Age: 74
End: 2024-09-05
Payer: MEDICARE

## 2024-09-05 VITALS
WEIGHT: 152.6 LBS | HEART RATE: 65 BPM | DIASTOLIC BLOOD PRESSURE: 86 MMHG | SYSTOLIC BLOOD PRESSURE: 150 MMHG | BODY MASS INDEX: 24.63 KG/M2

## 2024-09-05 DIAGNOSIS — Z98.890 POST-OPERATIVE STATE: Primary | ICD-10-CM

## 2024-09-05 DIAGNOSIS — Z98.890 S/P SACROCOLPOPEXY: ICD-10-CM

## 2024-09-05 LAB
POC APPEARANCE, URINE: CLEAR
POC BILIRUBIN, URINE: NEGATIVE
POC BLOOD, URINE: NEGATIVE
POC COLOR, URINE: ABNORMAL
POC GLUCOSE, URINE: NEGATIVE MG/DL
POC KETONES, URINE: NEGATIVE MG/DL
POC LEUKOCYTES, URINE: ABNORMAL
POC NITRITE,URINE: NEGATIVE
POC PH, URINE: 7 PH
POC PROTEIN, URINE: NEGATIVE MG/DL
POC SPECIFIC GRAVITY, URINE: 1.02
POC UROBILINOGEN, URINE: 0.2 EU/DL

## 2024-09-05 PROCEDURE — 1159F MED LIST DOCD IN RCRD: CPT | Performed by: NURSE PRACTITIONER

## 2024-09-05 PROCEDURE — 3077F SYST BP >= 140 MM HG: CPT | Performed by: NURSE PRACTITIONER

## 2024-09-05 PROCEDURE — 1126F AMNT PAIN NOTED NONE PRSNT: CPT | Performed by: NURSE PRACTITIONER

## 2024-09-05 PROCEDURE — 99211 OFF/OP EST MAY X REQ PHY/QHP: CPT | Performed by: NURSE PRACTITIONER

## 2024-09-05 PROCEDURE — 81003 URINALYSIS AUTO W/O SCOPE: CPT | Performed by: NURSE PRACTITIONER

## 2024-09-05 PROCEDURE — 3079F DIAST BP 80-89 MM HG: CPT | Performed by: NURSE PRACTITIONER

## 2024-09-05 ASSESSMENT — PAIN SCALES - GENERAL: PAINLEVEL: 0-NO PAIN

## 2024-09-05 NOTE — PROGRESS NOTES
Subjective   Eula Ku is a 73 y.o. female who presents to the clinic 4 weeks status post  Robot assisted sacrocolpopexy with cystoscopy  for  Vaginal vault prolapse . The patient is not having any pain. She reports she is healing well and expresses her desire to return to her usual activities such as gardening. She has been using estrogen cream as prescribed. She reports abdominal and vaginal swelling and inquires about the expected duration for the swelling to subside.       Objective   /86 (BP Location: Left arm, Patient Position: Sitting, BP Cuff Size: Adult)   Pulse 65   Wt 69.2 kg (152 lb 9.6 oz)   LMP  (LMP Unknown)   BMI 24.63 kg/m²   General:  alert and oriented, in no acute distress   Abdomen:    Incision:   healing well, no drainage, no erythema, no swelling, no dehiscence, incision well approximated       Assessment/Plan    Doing well postoperatively.  Operative findings again reviewed. Pathology report discussed.    1. Continue any current medications.  2. Wound care discussed.  3. Activity restrictions: no lifting more than 10 pounds and no sexual intercourse  4. Anticipated return to work: not applicable.  5. Follow up:  4-6  weeks       Scribe Attestation  By signing my name below, Casandra CRANE Scribe, attest that this documentation has been prepared under the direction and in the presence of CARLOS Aceves on 09/05/2024 at 3:01 PM.   IEdelmira APRN-CNP, personally performed the services described in this documentation which was scribed virtually and I confirm that it is both accurate and complete.

## 2024-10-10 ENCOUNTER — APPOINTMENT (OUTPATIENT)
Dept: OBSTETRICS AND GYNECOLOGY | Facility: CLINIC | Age: 74
End: 2024-10-10
Payer: MEDICARE

## 2024-10-15 ENCOUNTER — OFFICE VISIT (OUTPATIENT)
Dept: OBSTETRICS AND GYNECOLOGY | Facility: CLINIC | Age: 74
End: 2024-10-15
Payer: MEDICARE

## 2024-10-15 VITALS — SYSTOLIC BLOOD PRESSURE: 132 MMHG | DIASTOLIC BLOOD PRESSURE: 82 MMHG | HEART RATE: 71 BPM

## 2024-10-15 DIAGNOSIS — Z98.890 POST-OPERATIVE STATE: Primary | ICD-10-CM

## 2024-10-15 DIAGNOSIS — Z87.42 HISTORY OF UTERINE PROLAPSE: ICD-10-CM

## 2024-10-15 PROCEDURE — 3079F DIAST BP 80-89 MM HG: CPT | Performed by: NURSE PRACTITIONER

## 2024-10-15 PROCEDURE — 1159F MED LIST DOCD IN RCRD: CPT | Performed by: NURSE PRACTITIONER

## 2024-10-15 PROCEDURE — 3075F SYST BP GE 130 - 139MM HG: CPT | Performed by: NURSE PRACTITIONER

## 2024-10-15 PROCEDURE — 99211 OFF/OP EST MAY X REQ PHY/QHP: CPT | Performed by: NURSE PRACTITIONER

## 2024-10-15 PROCEDURE — 1126F AMNT PAIN NOTED NONE PRSNT: CPT | Performed by: NURSE PRACTITIONER

## 2024-10-15 ASSESSMENT — PAIN SCALES - GENERAL: PAINLEVEL: 0-NO PAIN

## 2024-10-15 NOTE — PROGRESS NOTES
Subjective   Eula Ku is a 74 y.o. female who presents s/p 8/2/24: Robot Assisted Sacrocolpoplexy, lysis of adhesions, cystoscopy. Previously s/p SSLF, posterior repair, cystoscopy on 3/2/2023.    Last seen on 9/5/24 for 4 week postop visit -> healing well at that time.     Postop Symptoms:  - She still feels like her vagina is swollen, but this has improved over time.   - Denies abnormal discharge or bleeding.   - She hasn't returned to intercourse due to her recent surgery and her  having prostate cancer.   - Continues to use tv estrogen cream 2x per week.   - She feels like the size of her abdomen is getting smaller.   - When she sits for over an hour, her buttock feels numb.     Objective   /82   Pulse 71   LMP  (LMP Unknown)   General:  alert and oriented, in no acute distress            Physical Exam  Genitourinary:      Genitourinary Comments: Well healed. Some scar tissue palpated at posterior vaginal wall and at the top of the vagina.            Assessment/Plan    74 y.o. female presenting in postop after surgery for VVP. Comorbidities include: GERD, HLD, hypothyroidism, and HTN.        Doing well postoperatively.    1. S/p 8/2/24: Robot Assisted Sacrocolpoplexy, lysis of adhesions, cystoscopy   2. Continue to use tv estrogen cream 2x per week.      3. Healing well without issues.      Follow-up in August 2025 with CARLOS Aceves.     Scribe Attestation:   IBhavna, am scribing for virtually, and in the presence of CARLOS Aceves on 10/15/2024 at 11:03 AM.   IEdelmira APRN-CNP, personally performed the services described in this documentation which was scribed virtually and I confirm that it is both accurate and complete.

## 2024-11-24 ENCOUNTER — OFFICE VISIT (OUTPATIENT)
Dept: URGENT CARE | Age: 74
End: 2024-11-24
Payer: MEDICARE

## 2024-11-24 VITALS
SYSTOLIC BLOOD PRESSURE: 143 MMHG | HEART RATE: 88 BPM | TEMPERATURE: 97.5 F | DIASTOLIC BLOOD PRESSURE: 80 MMHG | BODY MASS INDEX: 24.43 KG/M2 | RESPIRATION RATE: 18 BRPM | HEIGHT: 66 IN | OXYGEN SATURATION: 94 % | WEIGHT: 152 LBS

## 2024-11-24 DIAGNOSIS — Z20.822 CONTACT WITH AND (SUSPECTED) EXPOSURE TO COVID-19: ICD-10-CM

## 2024-11-24 DIAGNOSIS — U07.1 COVID: Primary | ICD-10-CM

## 2024-11-24 LAB — POC SARS-COV-2 AG BINAX: ABNORMAL

## 2024-11-24 PROCEDURE — 99213 OFFICE O/P EST LOW 20 MIN: CPT | Performed by: PHYSICIAN ASSISTANT

## 2024-11-24 PROCEDURE — 3077F SYST BP >= 140 MM HG: CPT | Performed by: PHYSICIAN ASSISTANT

## 2024-11-24 PROCEDURE — 1160F RVW MEDS BY RX/DR IN RCRD: CPT | Performed by: PHYSICIAN ASSISTANT

## 2024-11-24 PROCEDURE — 3079F DIAST BP 80-89 MM HG: CPT | Performed by: PHYSICIAN ASSISTANT

## 2024-11-24 PROCEDURE — 3008F BODY MASS INDEX DOCD: CPT | Performed by: PHYSICIAN ASSISTANT

## 2024-11-24 PROCEDURE — 1125F AMNT PAIN NOTED PAIN PRSNT: CPT | Performed by: PHYSICIAN ASSISTANT

## 2024-11-24 PROCEDURE — 1036F TOBACCO NON-USER: CPT | Performed by: PHYSICIAN ASSISTANT

## 2024-11-24 PROCEDURE — 1159F MED LIST DOCD IN RCRD: CPT | Performed by: PHYSICIAN ASSISTANT

## 2024-11-24 PROCEDURE — 87811 SARS-COV-2 COVID19 W/OPTIC: CPT | Performed by: PHYSICIAN ASSISTANT

## 2024-11-24 RX ORDER — APREPITANT 40 MG/1
CAPSULE ORAL
COMMUNITY
Start: 2024-08-02

## 2024-11-24 RX ORDER — BENZONATATE 200 MG/1
200 CAPSULE ORAL 3 TIMES DAILY PRN
Qty: 21 CAPSULE | Refills: 0 | Status: SHIPPED | OUTPATIENT
Start: 2024-11-24 | End: 2024-12-01

## 2024-11-24 ASSESSMENT — ENCOUNTER SYMPTOMS
RHINORRHEA: 1
DIARRHEA: 0
NAUSEA: 0
CHEST TIGHTNESS: 0
CONFUSION: 0
FATIGUE: 0
AGITATION: 0
SHORTNESS OF BREATH: 0
COUGH: 1
CHILLS: 0
ARTHRALGIAS: 0
ABDOMINAL PAIN: 0
VOMITING: 0
JOINT SWELLING: 0
FEVER: 1
SORE THROAT: 1

## 2024-11-24 ASSESSMENT — PAIN SCALES - GENERAL: PAINLEVEL_OUTOF10: 4

## 2024-11-24 NOTE — PROGRESS NOTES
Subjective   Patient ID: Eula Ku is a 74 y.o. female. They present today with a chief complaint of Other (+ COVID ).    History of Present Illness  Pt walked in with COVID symptoms. Reports symptoms started 2days ago. She was taking care of  at hospital and probably picked up virus there. Reports fever, cough, congestion, sore throat and fever seems like improved with OTC cold meds. Denies SOB, chest tightness. Denies hx of chronic lung disease.           Past Medical History  Allergies as of 2024 - Reviewed 2024   Allergen Reaction Noted    Hydrochlorothiazide Other 2023    Iodine Unknown 2023    Metaxalone Unknown 10/23/2023    Shellfish containing products Swelling 2023    Tetanus vaccines and toxoid Diarrhea and Other 2023    Amlodipine Other 2023    Penicillins Hives and Rash 2023       (Not in a hospital admission)       Past Medical History:   Diagnosis Date    Benign paroxysmal vertigo, unspecified ear 2013    Benign paroxysmal positional vertigo    Hyperlipidemia     Hypertension     Hyponatremia     Hypothyroidism     Meniere's disease, unspecified ear     Meniere's disease    Migraine with aura, not intractable, without status migrainosus     Ocular migraine    Osteoporosis     Personal history of other venous thrombosis and embolism      d/t pregnancy    PONV (postoperative nausea and vomiting)     Radiculopathy, lumbar region 2017    Lumbar radiculopathy    Retinal vein occlusion (CMS-HCC)     Vaginal vault prolapse        Past Surgical History:   Procedure Laterality Date    APPENDECTOMY  2014    Appendectomy     SECTION, CLASSIC  2014     Section    OTHER SURGICAL HISTORY  10/08/2020    Colonoscopy    OTHER SURGICAL HISTORY  2014    Pyloromyotomy    SACROCOLPOPEXY      TOTAL ABDOMINAL HYSTERECTOMY W/ BILATERAL SALPINGOOPHORECTOMY  2014    Total Abdominal Hysterectomy With Removal Of  "Both Ovaries        reports that she has never smoked. She has never used smokeless tobacco. She reports that she does not currently use alcohol. She reports that she does not use drugs.    Review of Systems  Review of Systems   Constitutional:  Positive for fever. Negative for chills and fatigue.   HENT:  Positive for congestion, rhinorrhea and sore throat.    Respiratory:  Positive for cough. Negative for chest tightness and shortness of breath.    Cardiovascular:  Negative for chest pain.   Gastrointestinal:  Negative for abdominal pain, diarrhea, nausea and vomiting.   Musculoskeletal:  Negative for arthralgias and joint swelling.   Skin:  Negative for rash.   Psychiatric/Behavioral:  Negative for agitation and confusion.                                   Objective    Vitals:    11/24/24 0909   BP: 143/80   BP Location: Right arm   Patient Position: Sitting   BP Cuff Size: Adult   Pulse: 88   Resp: 18   Temp: 36.4 °C (97.5 °F)   TempSrc: Skin   SpO2: 94%   Weight: 68.9 kg (152 lb)   Height: 1.676 m (5' 6\")     No LMP recorded (lmp unknown). Patient has had a hysterectomy.    Physical Exam  Constitutional:       Appearance: Normal appearance.   HENT:      Head: Normocephalic and atraumatic.      Right Ear: Tympanic membrane, ear canal and external ear normal.      Left Ear: Tympanic membrane, ear canal and external ear normal.      Nose: Congestion present.      Mouth/Throat:      Pharynx: No oropharyngeal exudate or posterior oropharyngeal erythema.   Cardiovascular:      Rate and Rhythm: Normal rate and regular rhythm.      Heart sounds: No murmur heard.  Pulmonary:      Effort: Pulmonary effort is normal. No respiratory distress.      Breath sounds: No stridor. No wheezing, rhonchi or rales.   Musculoskeletal:         General: Normal range of motion.   Neurological:      Mental Status: She is alert and oriented to person, place, and time.   Psychiatric:         Mood and Affect: Mood normal. "         Procedures    Point of Care Test & Imaging Results from this visit  Results for orders placed or performed in visit on 11/24/24   POCT Covid-19 Rapid Antigen   Result Value Ref Range    POC MARYLOU-COV-2 AG Positive test for SARS-CoV-2 (antigen detected) (A) Presumptive negative test for SARS-CoV-2 (no antigen detected)      No results found.    Diagnostic study results (if any) were reviewed by Lianet Sharif PA-C.    Assessment/Plan   Allergies, medications, history, and pertinent labs/EKGs/Imaging reviewed by Lianet Sharif PA-C.     Medical Decision Making  VSS , afebrile, Lung CTAB and remains stable condition  Not first time infection and denies hx of chronic lung disease, declined tx with steroid  Will continue conservative management at home and recommended F/U with PCP as needed  No signs of respiratory distress, educated warning s/s and when need to seek emergent medical intervention    Orders and Diagnoses  Diagnoses and all orders for this visit:  COVID  -     benzonatate (Tessalon) 200 mg capsule; Take 1 capsule (200 mg) by mouth 3 times a day as needed for cough for up to 7 days. Do not crush or chew.  Contact with and (suspected) exposure to covid-19  -     POCT Covid-19 Rapid Antigen      Medical Admin Record      Patient disposition: Home    Electronically signed by Lianet Sharif PA-C  9:36 AM

## 2024-12-04 ENCOUNTER — APPOINTMENT (OUTPATIENT)
Dept: PRIMARY CARE | Facility: CLINIC | Age: 74
End: 2024-12-04
Payer: MEDICARE

## 2024-12-04 VITALS
SYSTOLIC BLOOD PRESSURE: 110 MMHG | BODY MASS INDEX: 25.39 KG/M2 | DIASTOLIC BLOOD PRESSURE: 68 MMHG | HEART RATE: 70 BPM | WEIGHT: 158 LBS | HEIGHT: 66 IN

## 2024-12-04 DIAGNOSIS — H35.031 HYPERTENSIVE RETINOPATHY OF RIGHT EYE: ICD-10-CM

## 2024-12-04 DIAGNOSIS — I10 PRIMARY HYPERTENSION: ICD-10-CM

## 2024-12-04 DIAGNOSIS — Z00.00 MEDICARE ANNUAL WELLNESS VISIT, SUBSEQUENT: ICD-10-CM

## 2024-12-04 DIAGNOSIS — Z12.31 ENCOUNTER FOR SCREENING MAMMOGRAM FOR MALIGNANT NEOPLASM OF BREAST: Primary | ICD-10-CM

## 2024-12-04 DIAGNOSIS — E03.9 HYPOTHYROIDISM, UNSPECIFIED TYPE: ICD-10-CM

## 2024-12-04 DIAGNOSIS — M81.0 AGE-RELATED OSTEOPOROSIS WITHOUT CURRENT PATHOLOGICAL FRACTURE: ICD-10-CM

## 2024-12-04 DIAGNOSIS — E78.2 MIXED HYPERLIPIDEMIA: ICD-10-CM

## 2024-12-04 PROCEDURE — 99213 OFFICE O/P EST LOW 20 MIN: CPT | Performed by: FAMILY MEDICINE

## 2024-12-04 PROCEDURE — 3074F SYST BP LT 130 MM HG: CPT | Performed by: FAMILY MEDICINE

## 2024-12-04 PROCEDURE — 1159F MED LIST DOCD IN RCRD: CPT | Performed by: FAMILY MEDICINE

## 2024-12-04 PROCEDURE — 3078F DIAST BP <80 MM HG: CPT | Performed by: FAMILY MEDICINE

## 2024-12-04 PROCEDURE — 1170F FXNL STATUS ASSESSED: CPT | Performed by: FAMILY MEDICINE

## 2024-12-04 PROCEDURE — 3008F BODY MASS INDEX DOCD: CPT | Performed by: FAMILY MEDICINE

## 2024-12-04 PROCEDURE — G0439 PPPS, SUBSEQ VISIT: HCPCS | Performed by: FAMILY MEDICINE

## 2024-12-04 PROCEDURE — 1036F TOBACCO NON-USER: CPT | Performed by: FAMILY MEDICINE

## 2024-12-04 RX ORDER — INFLUENZA A VIRUS A/VICTORIA/4897/2022 IVR-238 (H1N1) ANTIGEN (FORMALDEHYDE INACTIVATED), INFLUENZA A VIRUS A/CALIFORNIA/122/2022 SAN-022 (H3N2) ANTIGEN (FORMALDEHYDE INACTIVATED), AND INFLUENZA B VIRUS B/MICHIGAN/01/2021 ANTIGEN (FORMALDEHYDE INACTIVATED) 60; 60; 60 UG/.5ML; UG/.5ML; UG/.5ML
0.5 INJECTION, SUSPENSION INTRAMUSCULAR ONCE
COMMUNITY
Start: 2024-09-27

## 2024-12-04 RX ORDER — AMLODIPINE BESYLATE 2.5 MG/1
2.5 TABLET ORAL DAILY
Qty: 90 TABLET | Refills: 3 | Status: SHIPPED | OUTPATIENT
Start: 2024-12-04 | End: 2025-11-29

## 2024-12-04 ASSESSMENT — ACTIVITIES OF DAILY LIVING (ADL)
DRESSING: INDEPENDENT
BATHING: INDEPENDENT

## 2024-12-04 ASSESSMENT — ENCOUNTER SYMPTOMS
OCCASIONAL FEELINGS OF UNSTEADINESS: 0
LOSS OF SENSATION IN FEET: 0
DEPRESSION: 0

## 2024-12-04 NOTE — PROGRESS NOTES
"Subjective   Patient ID: Eula Ku is a 74 y.o. female who presents for Follow-up (6 month follow up ) and Medicare Annual Wellness Visit Subsequent.    [facility]   Eula was seen today for annual Medicare wellness review, as well as review of her hypertension, hyperlipidemia, osteoporosis and thyroid medications.  Medication(s) are being taken and tolerated as prescribed, without concerns, list reconciled today.  Since her last visit she has resumed amlodipine, 5 mg tablets.  She is taking it every other day, blood pressures have been excellent.    Shingles, pneumonia vaccines are up to date  Flu vaccine  Tdap---allergic to previously  Mammogram--1/2024  DEXA--5/2024  Colonoscopy--8/2021    Review of Systems  The full review of systems is negative with the exception of what is noted in [facility]    Objective   /68   Pulse 70   Ht 1.676 m (5' 6\")   Wt 71.7 kg (158 lb)   LMP  (LMP Unknown)   BMI 25.50 kg/m²     Physical Exam  Constitutional/General appearance: alert, oriented, well-appearing, in no distress  Head and face exam is normal  No scleral icterus or conjunctival erythema present  Hearing is grossly normal  Respiratory effort is normal, no dyspnea noted  Cortical function is normal  Mood, affect, are pleasant, appropriate, and interactive.  Insight is normal  Cardiac exam reveals a regular rate and rhythm, no murmurs, rubs or gallops present.   Lungs are clear bilaterally.    No lower extremity edema present.    Assessment/Plan     See [facility] for wellness review, she is due for mammogram late January early February, will schedule this before she leaves.  She cannot have Tdap booster because of previous allergy    Hypertension, stable with low-dose amlodipine and lisinopril.  Continue all other medications    Follow-up in 6 months, fasting labs at that time    **Portions of this medical record have been created using voice recognition software and may have minor errors which are inherent in voice " recognition systems. It has not been fully edited for typographical or grammatical errors**

## 2025-01-03 DIAGNOSIS — I10 PRIMARY HYPERTENSION: ICD-10-CM

## 2025-01-03 DIAGNOSIS — K21.9 GASTROESOPHAGEAL REFLUX DISEASE WITHOUT ESOPHAGITIS: ICD-10-CM

## 2025-01-03 DIAGNOSIS — E78.2 MIXED HYPERLIPIDEMIA: ICD-10-CM

## 2025-01-03 DIAGNOSIS — E03.9 ACQUIRED HYPOTHYROIDISM: ICD-10-CM

## 2025-01-03 RX ORDER — PANTOPRAZOLE SODIUM 40 MG/1
40 TABLET, DELAYED RELEASE ORAL
Qty: 90 TABLET | Refills: 3 | Status: SHIPPED | OUTPATIENT
Start: 2025-01-03

## 2025-01-03 RX ORDER — ATORVASTATIN CALCIUM 10 MG/1
10 TABLET, FILM COATED ORAL DAILY
Qty: 90 TABLET | Refills: 3 | Status: SHIPPED | OUTPATIENT
Start: 2025-01-03 | End: 2026-01-03

## 2025-01-03 RX ORDER — LEVOTHYROXINE SODIUM 88 UG/1
88 TABLET ORAL DAILY
Qty: 90 TABLET | Refills: 3 | Status: SHIPPED | OUTPATIENT
Start: 2025-01-03 | End: 2026-01-03

## 2025-01-03 RX ORDER — LISINOPRIL 40 MG/1
40 TABLET ORAL DAILY
Qty: 90 TABLET | Refills: 3 | Status: SHIPPED | OUTPATIENT
Start: 2025-01-03 | End: 2026-01-03

## 2025-01-09 ENCOUNTER — TELEPHONE (OUTPATIENT)
Dept: PRIMARY CARE | Facility: CLINIC | Age: 75
End: 2025-01-09
Payer: MEDICARE

## 2025-01-09 NOTE — TELEPHONE ENCOUNTER
Pt states her medication Boniva 150 mg has been causing some issues, medication is  causing gas pain and heart burn. Says she not taking well to this medication and to know if possible can she switch to a different medication that won't cause upset stomach and pain.

## 2025-01-10 DIAGNOSIS — Z78.9 INTOLERANCE OF ORAL BISPHOSPHONATE THERAPY: ICD-10-CM

## 2025-01-10 DIAGNOSIS — M81.0 AGE-RELATED OSTEOPOROSIS WITHOUT CURRENT PATHOLOGICAL FRACTURE: Primary | ICD-10-CM

## 2025-01-10 NOTE — TELEPHONE ENCOUNTER
There are 2 other in the same category that cold be tried, but have the same potential side effects of course.  The next step in her case would be Prolia, an every 6 month injection that would likely not upset her stomach.  Have her read about it a little.  Could always see a rheumatologist for an opinion (they specialize in osteoporosis treatment)

## 2025-01-13 NOTE — TELEPHONE ENCOUNTER
I sent a prescription for a prolia injection to her pharmacy.  It may need prior approval.  It comes in a preloaded syringe, can be self-injected if she can, or a nv scheduled for the first.    Every 6 months

## 2025-01-14 RX ORDER — RISEDRONATE SODIUM 150 MG/1
150 TABLET, FILM COATED ORAL
Qty: 3 TABLET | Refills: 3 | Status: SHIPPED | OUTPATIENT
Start: 2025-01-14 | End: 2026-01-14

## 2025-01-14 NOTE — TELEPHONE ENCOUNTER
Pt called back stating that she has changed her mind and no longer wants to take the prolia injection, states there are too many side effects. Would like to go back to Boniva 150 mg  or try something else possibly.

## 2025-01-17 ENCOUNTER — APPOINTMENT (OUTPATIENT)
Dept: ORTHOPEDIC SURGERY | Facility: CLINIC | Age: 75
End: 2025-01-17
Payer: MEDICARE

## 2025-01-27 ENCOUNTER — HOSPITAL ENCOUNTER (OUTPATIENT)
Dept: RADIOLOGY | Facility: CLINIC | Age: 75
Discharge: HOME | End: 2025-01-27
Payer: MEDICARE

## 2025-01-27 VITALS — BODY MASS INDEX: 25.4 KG/M2 | WEIGHT: 158.07 LBS | HEIGHT: 66 IN

## 2025-01-27 DIAGNOSIS — Z12.31 ENCOUNTER FOR SCREENING MAMMOGRAM FOR MALIGNANT NEOPLASM OF BREAST: ICD-10-CM

## 2025-01-27 PROCEDURE — 77067 SCR MAMMO BI INCL CAD: CPT

## 2025-01-27 PROCEDURE — 77067 SCR MAMMO BI INCL CAD: CPT | Performed by: RADIOLOGY

## 2025-01-27 PROCEDURE — 77063 BREAST TOMOSYNTHESIS BI: CPT | Performed by: RADIOLOGY

## 2025-01-30 ENCOUNTER — APPOINTMENT (OUTPATIENT)
Dept: ORTHOPEDIC SURGERY | Facility: CLINIC | Age: 75
End: 2025-01-30
Payer: MEDICARE

## 2025-02-07 ENCOUNTER — APPOINTMENT (OUTPATIENT)
Dept: ORTHOPEDIC SURGERY | Facility: CLINIC | Age: 75
End: 2025-02-07
Payer: MEDICARE

## 2025-02-20 ENCOUNTER — OFFICE VISIT (OUTPATIENT)
Dept: ORTHOPEDIC SURGERY | Facility: CLINIC | Age: 75
End: 2025-02-20
Payer: MEDICARE

## 2025-02-20 ENCOUNTER — HOSPITAL ENCOUNTER (OUTPATIENT)
Dept: RADIOLOGY | Facility: CLINIC | Age: 75
Discharge: HOME | End: 2025-02-20
Payer: MEDICARE

## 2025-02-20 DIAGNOSIS — M17.12 PRIMARY OSTEOARTHRITIS OF LEFT KNEE: ICD-10-CM

## 2025-02-20 PROCEDURE — 99203 OFFICE O/P NEW LOW 30 MIN: CPT | Performed by: ORTHOPAEDIC SURGERY

## 2025-02-20 PROCEDURE — 20610 DRAIN/INJ JOINT/BURSA W/O US: CPT | Mod: LT | Performed by: ORTHOPAEDIC SURGERY

## 2025-02-20 PROCEDURE — 73564 X-RAY EXAM KNEE 4 OR MORE: CPT | Mod: LT

## 2025-02-20 PROCEDURE — 2500000004 HC RX 250 GENERAL PHARMACY W/ HCPCS (ALT 636 FOR OP/ED): Performed by: ORTHOPAEDIC SURGERY

## 2025-02-20 PROCEDURE — 99213 OFFICE O/P EST LOW 20 MIN: CPT | Mod: 25 | Performed by: ORTHOPAEDIC SURGERY

## 2025-03-09 PROCEDURE — 2500000004 HC RX 250 GENERAL PHARMACY W/ HCPCS (ALT 636 FOR OP/ED): Performed by: ORTHOPAEDIC SURGERY

## 2025-03-09 PROCEDURE — 20610 DRAIN/INJ JOINT/BURSA W/O US: CPT | Mod: LT | Performed by: ORTHOPAEDIC SURGERY

## 2025-03-09 RX ORDER — TRIAMCINOLONE ACETONIDE 40 MG/ML
40 INJECTION, SUSPENSION INTRA-ARTICULAR; INTRAMUSCULAR
Status: COMPLETED | OUTPATIENT
Start: 2025-03-09 | End: 2025-03-09

## 2025-03-09 RX ORDER — LIDOCAINE HYDROCHLORIDE 10 MG/ML
4 INJECTION, SOLUTION INFILTRATION; PERINEURAL
Status: COMPLETED | OUTPATIENT
Start: 2025-03-09 | End: 2025-03-09

## 2025-03-09 RX ADMIN — TRIAMCINOLONE ACETONIDE 40 MG: 400 INJECTION, SUSPENSION INTRA-ARTICULAR; INTRAMUSCULAR at 16:04

## 2025-03-09 RX ADMIN — LIDOCAINE HYDROCHLORIDE 4 ML: 10 INJECTION, SOLUTION INFILTRATION; PERINEURAL at 16:04

## 2025-03-09 NOTE — PROGRESS NOTES
Patient is a pleasant 74-year-old female presents today for evaluation of left knee pain.  She has not had any injections or previous surgery on the knee.  She points to the medial aspect of the knee as the main location of the pain.    Left knee:  AAOx3, NAD, walks with a mild antalgic gait  Varus allignment  Range of motion lacks 5 degrees of full extension and flexes to 110 degrees  Stable to varus/valgus/anterior/posterior stress through out the range of motion  Slight laxity with varus stress  Diffuse medial  joint line tenderness to palpation  Moderate effusion  SILT in a cruz/saph/per/tib distribution  5/5 knee extension/df/pf/ehl  ½ dorsalis pedis and posterior tibial pulse  no popliteal lymphadenopathy  no other overlying lesions  mood: euthymic  Respirations non labored    Plain films were reviewed by myself in clinic today.  She has moderate osteoarthritis of her left knee evidenced by medial joint space narrowing and underlying sclerosis otherwise negative for osseous or soft tissue abnormality.    We discussed further conservative treatment today in clinic.  This included anti-inflammatories, Tylenol, injections.  She elected undergo a cortisone injection today in clinic.To provide some relief.  She can repeat it in 3 to 4 months needed.  All of her questions were answered.    L Inj/Asp: L knee on 3/9/2025 4:04 PM  Indications: pain and joint swelling  Details: 22 G needle, anterolateral approach  Medications: 40 mg triamcinolone acetonide 40 mg/mL; 4 mL lidocaine 10 mg/mL (1 %)  Outcome: tolerated well, no immediate complications    Discussion:  I discussed the conservative treatment options for knee osteoarthritis including but not limited to physical therapy, oral NSAIDS, activity and lifestyle modification, and corticosteroid injections. Pt has elected to undergo a cortisone injection today. I have explained the risk and benefits of an injection including the possibility of joint infection, bleeding,  damage to cartilage, allergic reaction. Patient verbalized understanding and gave verbal consent wishes to proceed with a intra-articular cortisone injection for their knee.    Procedure:  After discussing the risk and benefits of the procedure, we proceeded with an intra-articular left knee injection.    With the patient's informed verbal consent, the left knee was prepped in standard sterile fashion with Chlorhexidine. The skin was then anesthetized with ethyl chloride spray and cleaned again with Chlorhexidine. The knee was then apirated/injected with a prefilled 20-gauge syringe of 40 mg Kenalog + 4 ml Lidocaine using the lateral approach without complications.  The patient tolerated this well and felt immediate initial relief of symptoms. A bandaid was applied and the patient ambulated out of the clinic on ther own accord without difficulty. Patient was instructed to avoid physical activity for 24-48 hours to prevent the knees from swelling and may ice the knees as tolerated. Patient should contact the office if any signs of of infection appear: redness, fever, chills, drainage, swelling or warmth to the knees.  Pt understands that the injections can be repeated no sooner than 3 months.  Procedure, treatment alternatives, risks and benefits explained, specific risks discussed. Consent was given by the patient. Immediately prior to procedure a time out was called to verify the correct patient, procedure, equipment, support staff and site/side marked as required. Patient was prepped and draped in the usual sterile fashion.

## 2025-05-07 ENCOUNTER — APPOINTMENT (OUTPATIENT)
Dept: PRIMARY CARE | Facility: CLINIC | Age: 75
End: 2025-05-07
Payer: MEDICARE

## 2025-05-07 VITALS
OXYGEN SATURATION: 94 % | HEIGHT: 66 IN | HEART RATE: 68 BPM | BODY MASS INDEX: 25.88 KG/M2 | SYSTOLIC BLOOD PRESSURE: 124 MMHG | WEIGHT: 161 LBS | DIASTOLIC BLOOD PRESSURE: 84 MMHG

## 2025-05-07 DIAGNOSIS — Z00.00 MEDICARE ANNUAL WELLNESS VISIT, SUBSEQUENT: Primary | ICD-10-CM

## 2025-05-07 DIAGNOSIS — I10 PRIMARY HYPERTENSION: ICD-10-CM

## 2025-05-07 DIAGNOSIS — K22.719 BARRETT'S ESOPHAGUS WITH DYSPLASIA: ICD-10-CM

## 2025-05-07 DIAGNOSIS — Z76.89 ENCOUNTER TO ESTABLISH CARE: ICD-10-CM

## 2025-05-07 DIAGNOSIS — E78.5 HYPERLIPIDEMIA, UNSPECIFIED HYPERLIPIDEMIA TYPE: ICD-10-CM

## 2025-05-07 DIAGNOSIS — K21.9 GASTROESOPHAGEAL REFLUX DISEASE, UNSPECIFIED WHETHER ESOPHAGITIS PRESENT: ICD-10-CM

## 2025-05-07 DIAGNOSIS — M81.0 OSTEOPOROSIS, UNSPECIFIED OSTEOPOROSIS TYPE, UNSPECIFIED PATHOLOGICAL FRACTURE PRESENCE: ICD-10-CM

## 2025-05-07 DIAGNOSIS — M79.18 PAIN OF THIGH MUSCLE: ICD-10-CM

## 2025-05-07 DIAGNOSIS — Z00.00 ANNUAL PHYSICAL EXAM: ICD-10-CM

## 2025-05-07 LAB
25(OH)D3+25(OH)D2 SERPL-MCNC: 84 NG/ML (ref 30–100)
ALBUMIN SERPL-MCNC: 4.6 G/DL (ref 3.6–5.1)
ALP SERPL-CCNC: 71 U/L (ref 37–153)
ALT SERPL-CCNC: 17 U/L (ref 6–29)
ANION GAP SERPL CALCULATED.4IONS-SCNC: 9 MMOL/L (CALC) (ref 7–17)
APPEARANCE UR: CLEAR
AST SERPL-CCNC: 19 U/L (ref 10–35)
BACTERIA #/AREA URNS HPF: ABNORMAL /HPF
BASOPHILS # BLD AUTO: 34 CELLS/UL (ref 0–200)
BASOPHILS NFR BLD AUTO: 0.4 %
BILIRUB SERPL-MCNC: 0.5 MG/DL (ref 0.2–1.2)
BILIRUB UR QL STRIP: NEGATIVE
BUN SERPL-MCNC: 14 MG/DL (ref 7–25)
CALCIUM SERPL-MCNC: 10 MG/DL (ref 8.6–10.4)
CHLORIDE SERPL-SCNC: 101 MMOL/L (ref 98–110)
CHOLEST SERPL-MCNC: 170 MG/DL
CHOLEST/HDLC SERPL: 2.4 (CALC)
CO2 SERPL-SCNC: 27 MMOL/L (ref 20–32)
COLOR UR: YELLOW
CREAT SERPL-MCNC: 0.94 MG/DL (ref 0.6–1)
EGFRCR SERPLBLD CKD-EPI 2021: 64 ML/MIN/1.73M2
EOSINOPHIL # BLD AUTO: 77 CELLS/UL (ref 15–500)
EOSINOPHIL NFR BLD AUTO: 0.9 %
ERYTHROCYTE [DISTWIDTH] IN BLOOD BY AUTOMATED COUNT: 12.1 % (ref 11–15)
GLUCOSE SERPL-MCNC: 104 MG/DL (ref 65–99)
GLUCOSE UR QL STRIP: NEGATIVE
HCT VFR BLD AUTO: 45.7 % (ref 35–45)
HDLC SERPL-MCNC: 70 MG/DL
HGB BLD-MCNC: 15.1 G/DL (ref 11.7–15.5)
HGB UR QL STRIP: NEGATIVE
HYALINE CASTS #/AREA URNS LPF: ABNORMAL /LPF
KETONES UR QL STRIP: NEGATIVE
LDLC SERPL CALC-MCNC: 83 MG/DL (CALC)
LEUKOCYTE ESTERASE UR QL STRIP: ABNORMAL
LYMPHOCYTES # BLD AUTO: 1230 CELLS/UL (ref 850–3900)
LYMPHOCYTES NFR BLD AUTO: 14.3 %
MAGNESIUM SERPL-MCNC: 2.3 MG/DL (ref 1.5–2.5)
MCH RBC QN AUTO: 30.4 PG (ref 27–33)
MCHC RBC AUTO-ENTMCNC: 33 G/DL (ref 32–36)
MCV RBC AUTO: 92.1 FL (ref 80–100)
MONOCYTES # BLD AUTO: 783 CELLS/UL (ref 200–950)
MONOCYTES NFR BLD AUTO: 9.1 %
NEUTROPHILS # BLD AUTO: 6476 CELLS/UL (ref 1500–7800)
NEUTROPHILS NFR BLD AUTO: 75.3 %
NITRITE UR QL STRIP: NEGATIVE
NONHDLC SERPL-MCNC: 100 MG/DL (CALC)
PH UR STRIP: 7.5 [PH] (ref 5–8)
PLATELET # BLD AUTO: 325 THOUSAND/UL (ref 140–400)
PMV BLD REES-ECKER: 10.1 FL (ref 7.5–12.5)
POTASSIUM SERPL-SCNC: 5 MMOL/L (ref 3.5–5.3)
PROT SERPL-MCNC: 7.2 G/DL (ref 6.1–8.1)
PROT UR QL STRIP: NEGATIVE
RBC # BLD AUTO: 4.96 MILLION/UL (ref 3.8–5.1)
RBC #/AREA URNS HPF: ABNORMAL /HPF
SERVICE CMNT-IMP: ABNORMAL
SODIUM SERPL-SCNC: 137 MMOL/L (ref 135–146)
SP GR UR STRIP: 1.01 (ref 1–1.03)
SQUAMOUS #/AREA URNS HPF: ABNORMAL /HPF
TRIGL SERPL-MCNC: 81 MG/DL
TSH SERPL-ACNC: 3.42 MIU/L (ref 0.4–4.5)
WBC # BLD AUTO: 8.6 THOUSAND/UL (ref 3.8–10.8)
WBC #/AREA URNS HPF: ABNORMAL /HPF

## 2025-05-07 PROCEDURE — G0439 PPPS, SUBSEQ VISIT: HCPCS | Performed by: STUDENT IN AN ORGANIZED HEALTH CARE EDUCATION/TRAINING PROGRAM

## 2025-05-07 PROCEDURE — 1036F TOBACCO NON-USER: CPT | Performed by: STUDENT IN AN ORGANIZED HEALTH CARE EDUCATION/TRAINING PROGRAM

## 2025-05-07 PROCEDURE — G2211 COMPLEX E/M VISIT ADD ON: HCPCS | Performed by: STUDENT IN AN ORGANIZED HEALTH CARE EDUCATION/TRAINING PROGRAM

## 2025-05-07 PROCEDURE — 1123F ACP DISCUSS/DSCN MKR DOCD: CPT | Performed by: STUDENT IN AN ORGANIZED HEALTH CARE EDUCATION/TRAINING PROGRAM

## 2025-05-07 PROCEDURE — 1170F FXNL STATUS ASSESSED: CPT | Performed by: STUDENT IN AN ORGANIZED HEALTH CARE EDUCATION/TRAINING PROGRAM

## 2025-05-07 PROCEDURE — 3074F SYST BP LT 130 MM HG: CPT | Performed by: STUDENT IN AN ORGANIZED HEALTH CARE EDUCATION/TRAINING PROGRAM

## 2025-05-07 PROCEDURE — 1160F RVW MEDS BY RX/DR IN RCRD: CPT | Performed by: STUDENT IN AN ORGANIZED HEALTH CARE EDUCATION/TRAINING PROGRAM

## 2025-05-07 PROCEDURE — 99497 ADVNCD CARE PLAN 30 MIN: CPT | Performed by: STUDENT IN AN ORGANIZED HEALTH CARE EDUCATION/TRAINING PROGRAM

## 2025-05-07 PROCEDURE — 3008F BODY MASS INDEX DOCD: CPT | Performed by: STUDENT IN AN ORGANIZED HEALTH CARE EDUCATION/TRAINING PROGRAM

## 2025-05-07 PROCEDURE — 1159F MED LIST DOCD IN RCRD: CPT | Performed by: STUDENT IN AN ORGANIZED HEALTH CARE EDUCATION/TRAINING PROGRAM

## 2025-05-07 PROCEDURE — 99214 OFFICE O/P EST MOD 30 MIN: CPT | Performed by: STUDENT IN AN ORGANIZED HEALTH CARE EDUCATION/TRAINING PROGRAM

## 2025-05-07 PROCEDURE — 3079F DIAST BP 80-89 MM HG: CPT | Performed by: STUDENT IN AN ORGANIZED HEALTH CARE EDUCATION/TRAINING PROGRAM

## 2025-05-07 ASSESSMENT — ENCOUNTER SYMPTOMS
DEPRESSION: 0
OCCASIONAL FEELINGS OF UNSTEADINESS: 0
LOSS OF SENSATION IN FEET: 0

## 2025-05-07 ASSESSMENT — PATIENT HEALTH QUESTIONNAIRE - PHQ9
SUM OF ALL RESPONSES TO PHQ9 QUESTIONS 1 AND 2: 0
1. LITTLE INTEREST OR PLEASURE IN DOING THINGS: NOT AT ALL
2. FEELING DOWN, DEPRESSED OR HOPELESS: NOT AT ALL

## 2025-05-07 ASSESSMENT — ACTIVITIES OF DAILY LIVING (ADL)
DOING_HOUSEWORK: INDEPENDENT
BATHING: INDEPENDENT
DRESSING: INDEPENDENT
GROCERY_SHOPPING: INDEPENDENT
MANAGING_FINANCES: INDEPENDENT
TAKING_MEDICATION: INDEPENDENT

## 2025-05-07 NOTE — PROGRESS NOTES
Subjective   Patient ID: Eula Ku is a 74 y.o. female who presents for the following    Assessment/Plan   Preventative Medicine   -Allergic to tetanus vaccine.   -Colonoscopy done in 2021. Repeat in 2031  -MMG In Jan 2025. Repeat in Jan 2026.   -DEXA done in 2024. Has osteoporosis.   -ACP (16 minutes).  [place] is HCA. Code status discussed. Currently to be full code. Will discuss with  and family.     Osteoporosis   -Dx in 2024.   -Currently on risendronate   -Having a lot of GI upset. In the setting of barretts esophagus.   -Wants to go onto prolia due to GI upset and worsening reflux. Side effect profile discussed.   -Check Vitamin D and Calcium levels.     HTN  -Well controlled on norvasc 2.5mg Daily and lisinopril 40mg PO daily   -Low salt diet     HLD/ASCVD  -Currently on atorvastatin 10mg daily   -Complaining of 2-3 months of BL thigh pain  PLAN  -Hold statin for a few days  -Check Urine for myoglobin  -Consider switching to pravastatin   -Low fat, low carb diet     Barretts Esophagus  -Needs repeat EGD.  -Follows up with [facility] for surveillance   -Continue Protonix     Vaginal Atrophy  -Continue topical premarin       HPI  74-year-old female presents for annual Medicare wellness, establish care    Currently doing well.  Annual Medicare wellness in chart    Blood pressure is well-controlled on Norvasc and lisinopril.    Has a history of osteoporosis and states that she is currently on bisphosphonate.  States that bisphosphonate is exacerbating her acid reflux and she is having frequent reflux symptoms.  No alarm symptoms reported.  Discussed possibly trying Prolia and patient is interested.    Also complaining of bilateral thigh pain that has been ongoing for the past 2 to 3 months.  Discussed that this may be related to her statin.  Denies any low back pain, radiculopathy, myelopathy.    Diet is overall okay. Eats a healthy diet. Walks with  and dog 4-5 x week.     Denies fevers,  "chills, weight loss, lightheadedness, dizziness, vision changes, sore throat, runny nose, CP, SOB, cough, palpitations, n/v/d, abd pain, black/bloody stools, mood disturbance, or new numbness/weakness/tingling in arms/legs/face.        PMH: HLD, Vitamin D, hypothyroidism, HTN, GERD, osteoporosis, barretts esophagus   Surgeries: August 2024 prolapsed bladder/rectocele, hysterectomy in 2000.     Family Hx  -Cancer hx: no colon, breast, ovarian cancer reported.     Social Hx  T: denies  A: denies  D: denies    Personal Hx  -Retired RN  -    Social Drivers of Health     Tobacco Use: Low Risk  (5/7/2025)    Patient History     Smoking Tobacco Use: Never     Smokeless Tobacco Use: Never     Passive Exposure: Not on file   Alcohol Use: Not on file   Financial Resource Strain: Not on file   Food Insecurity: Not on file   Transportation Needs: Not on file   Physical Activity: Not on file   Stress: Not on file   Social Connections: Not on file   Intimate Partner Violence: Not on file   Depression: Not at risk (10/26/2023)    PHQ-2     PHQ-2 Score: 0   Housing Stability: Not on file   Utilities: Not on file   Digital Equity: Not on file   Health Literacy: Not on file         Visit Vitals  /84   Pulse 68   Ht 1.676 m (5' 5.98\")   Wt 73 kg (161 lb)   LMP  (LMP Unknown)   SpO2 94%   BMI 26.00 kg/m²   OB Status Hysterectomy   Smoking Status Never   BSA 1.84 m²     PHYSICAL EXAM   Physical Exam       General: NAD. NCAT. Aox3   HEENT: PERRLA. EOMI. MMM. Nares patent bl.  Cardiovascular: RRR. No MRG. S1/S2 wnl.   Respiratory: CTABL. No acute respiratory distress.   GI: Soft, NT abdomen. BS present x 4.   MSK: ROM x 4. CTLS non-tender. BL thigh pain. Muscle bulk intact. No atrophy noted.   Extremities: No edema. Cap refill < 2 sec.   Skin: No rashes or bruises.   Neuro: Aox3. Cranial Nerves grossly intact. Motor/sensory wnl.   Psych: Mood wnl.      REVIEW OF SYSTEMS     ROS IN HPI   Allergies[1]    Current " Medications[2]    Objective     No visits with results within 4 Month(s) from this visit.   Latest known visit with results is:   Office Visit on 11/24/2024   Component Date Value Ref Range Status    POC MARYLOU-COV-2 AG 11/24/2024 Positive test for SARS-CoV-2 (antigen detected) (A)  Presumptive negative test for SARS-CoV-2 (no antigen detected) Final       Radiology: Reviewed imaging in powerchart.  Imaging  No results found.    Cardiology, Vascular, and Other Imaging  No other imaging results found for the past 7 days      Family History[3]  Social History[4]  Medical History[5]  Surgical History[6]    Charting was completed using voice recognition technology and may include unintended errors.            [1]   Allergies  Allergen Reactions    Hydrochlorothiazide Other     hyponatremia    Iodine Unknown    Metaxalone Unknown    Shellfish Containing Products Swelling    Tetanus Vaccines And Toxoid Diarrhea and Other    Amlodipine Other     Lower extremity edema    Ibandronate GI Upset    Penicillins Hives and Rash   [2]   Current Outpatient Medications   Medication Sig Dispense Refill    amLODIPine (Norvasc) 2.5 mg tablet Take 1 tablet (2.5 mg) by mouth once daily. (Patient taking differently: Take 2 tablets (5 mg) by mouth once daily.) 90 tablet 3    atorvastatin (Lipitor) 10 mg tablet Take 1 tablet (10 mg) by mouth once daily. 90 tablet 3    cholecalciferol (Vitamin D-3) 25 MCG (1000 UT) tablet Take 3 tablets (75 mcg) by mouth once daily.      estrogens, conjugated, (Premarin) vaginal cream Insert/use 0.5 grams twice weekly or as directed 60 g 3    levothyroxine (Synthroid, Levoxyl) 88 mcg tablet Take 1 tablet (88 mcg) by mouth once daily. 90 tablet 3    lisinopril 40 mg tablet Take 1 tablet (40 mg) by mouth once daily. 90 tablet 3    mv-min/iron/folic/calcium/vitK (WOMEN'S MULTIVITAMIN ORAL) Take 1 tablet by mouth once daily.      pantoprazole (ProtoNix) 40 mg EC tablet Take 1 tablet (40 mg) by mouth once daily in  the morning. Take before meals. 90 tablet 3    acetaminophen (Tylenol) 325 mg tablet Take 3 tablets (975 mg) by mouth every 6 hours if needed for mild pain (1 - 3) for up to 20 doses. Take alternating with Ibuprofen (Patient not taking: Reported on 5/7/2025) 20 tablet 0    aprepitant (Emend) 40 mg capsule       cetirizine (ZyrTEC) 10 mg tablet Take 1 tablet (10 mg) by mouth once daily. (Patient not taking: Reported on 5/7/2025)      Fluzone High-Dose Triv 24-25 syringe Inject 0.5 mL into the muscle 1 time. (Patient not taking: Reported on 5/7/2025)      risedronate (ActoneL) 150 mg tablet Take 1 tablet (150 mg) by mouth every 30 (thirty) days. Take in morning with full glass of water on an empty stomach. No food, drink, meds, or lying down for 30 minutes after. (Patient not taking: Reported on 5/7/2025) 3 tablet 3     No current facility-administered medications for this visit.   [3]   Family History  Problem Relation Name Age of Onset    Cancer Mother      Cancer Father      Breast cancer Sister  45   [4]   Social History  Socioeconomic History    Marital status:    Tobacco Use    Smoking status: Never    Smokeless tobacco: Never   Vaping Use    Vaping status: Never Used   Substance and Sexual Activity    Alcohol use: Not Currently    Drug use: Never   [5]   Past Medical History:  Diagnosis Date    Benign paroxysmal vertigo, unspecified ear 12/11/2013    Benign paroxysmal positional vertigo    Fibrocystic breast 2000    Hyperlipidemia     Hypertension     Hyponatremia     Hypothyroidism     Meniere's disease, unspecified ear     Meniere's disease    Migraine with aura, not intractable, without status migrainosus     Ocular migraine    Osteoporosis     Personal history of other venous thrombosis and embolism     1977 d/t pregnancy    PONV (postoperative nausea and vomiting)     Radiculopathy, lumbar region 02/02/2017    Lumbar radiculopathy    Retinal vein occlusion     Vaginal vault prolapse    [6]   Past  Surgical History:  Procedure Laterality Date    APPENDECTOMY  2014    Appendectomy     SECTION, CLASSIC  2014     Section    OTHER SURGICAL HISTORY  10/08/2020    Colonoscopy    OTHER SURGICAL HISTORY  2014    Pyloromyotomy    SACROCOLPOPEXY      TOTAL ABDOMINAL HYSTERECTOMY W/ BILATERAL SALPINGOOPHORECTOMY  2014    Total Abdominal Hysterectomy With Removal Of Both Ovaries

## 2025-05-09 ENCOUNTER — TELEPHONE (OUTPATIENT)
Dept: PRIMARY CARE | Facility: CLINIC | Age: 75
End: 2025-05-09
Payer: MEDICARE

## 2025-05-09 NOTE — TELEPHONE ENCOUNTER
----- Message from Patty John sent at 5/8/2025  4:21 PM EDT -----  Mild hyperglycemia. Add on A1c please.   UA shows LE, but pt is asymptomatic. Monitor for si/sx of UTI     ----- Message -----  From: [facility] Results In  Sent: 5/7/2025  10:11 PM EDT  To: Patty John MD

## 2025-05-13 LAB
25(OH)D3+25(OH)D2 SERPL-MCNC: 84 NG/ML (ref 30–100)
ALBUMIN SERPL-MCNC: 4.6 G/DL (ref 3.6–5.1)
ALP SERPL-CCNC: 71 U/L (ref 37–153)
ALT SERPL-CCNC: 17 U/L (ref 6–29)
ANION GAP SERPL CALCULATED.4IONS-SCNC: 9 MMOL/L (CALC) (ref 7–17)
APPEARANCE UR: CLEAR
AST SERPL-CCNC: 19 U/L (ref 10–35)
BACTERIA #/AREA URNS HPF: ABNORMAL /HPF
BASOPHILS # BLD AUTO: 34 CELLS/UL (ref 0–200)
BASOPHILS NFR BLD AUTO: 0.4 %
BILIRUB SERPL-MCNC: 0.5 MG/DL (ref 0.2–1.2)
BILIRUB UR QL STRIP: NEGATIVE
BUN SERPL-MCNC: 14 MG/DL (ref 7–25)
CALCIUM SERPL-MCNC: 10 MG/DL (ref 8.6–10.4)
CHLORIDE SERPL-SCNC: 101 MMOL/L (ref 98–110)
CHOLEST SERPL-MCNC: 170 MG/DL
CHOLEST/HDLC SERPL: 2.4 (CALC)
CO2 SERPL-SCNC: 27 MMOL/L (ref 20–32)
COLOR UR: YELLOW
CREAT SERPL-MCNC: 0.94 MG/DL (ref 0.6–1)
EGFRCR SERPLBLD CKD-EPI 2021: 64 ML/MIN/1.73M2
EOSINOPHIL # BLD AUTO: 77 CELLS/UL (ref 15–500)
EOSINOPHIL NFR BLD AUTO: 0.9 %
ERYTHROCYTE [DISTWIDTH] IN BLOOD BY AUTOMATED COUNT: 12.1 % (ref 11–15)
GLUCOSE SERPL-MCNC: 104 MG/DL (ref 65–99)
GLUCOSE UR QL STRIP: NEGATIVE
HBA1C MFR BLD: 6.1 %
HCT VFR BLD AUTO: 45.7 % (ref 35–45)
HDLC SERPL-MCNC: 70 MG/DL
HGB BLD-MCNC: 15.1 G/DL (ref 11.7–15.5)
HGB UR QL STRIP: NEGATIVE
HYALINE CASTS #/AREA URNS LPF: ABNORMAL /LPF
KETONES UR QL STRIP: NEGATIVE
LDLC SERPL CALC-MCNC: 83 MG/DL (CALC)
LEUKOCYTE ESTERASE UR QL STRIP: ABNORMAL
LYMPHOCYTES # BLD AUTO: 1230 CELLS/UL (ref 850–3900)
LYMPHOCYTES NFR BLD AUTO: 14.3 %
MAGNESIUM SERPL-MCNC: 2.3 MG/DL (ref 1.5–2.5)
MCH RBC QN AUTO: 30.4 PG (ref 27–33)
MCHC RBC AUTO-ENTMCNC: 33 G/DL (ref 32–36)
MCV RBC AUTO: 92.1 FL (ref 80–100)
MONOCYTES # BLD AUTO: 783 CELLS/UL (ref 200–950)
MONOCYTES NFR BLD AUTO: 9.1 %
NEUTROPHILS # BLD AUTO: 6476 CELLS/UL (ref 1500–7800)
NEUTROPHILS NFR BLD AUTO: 75.3 %
NITRITE UR QL STRIP: NEGATIVE
NONHDLC SERPL-MCNC: 100 MG/DL (CALC)
PH UR STRIP: 7.5 [PH] (ref 5–8)
PLATELET # BLD AUTO: 325 THOUSAND/UL (ref 140–400)
PMV BLD REES-ECKER: 10.1 FL (ref 7.5–12.5)
POTASSIUM SERPL-SCNC: 5 MMOL/L (ref 3.5–5.3)
PROT SERPL-MCNC: 7.2 G/DL (ref 6.1–8.1)
PROT UR QL STRIP: NEGATIVE
RBC # BLD AUTO: 4.96 MILLION/UL (ref 3.8–5.1)
RBC #/AREA URNS HPF: ABNORMAL /HPF
SERVICE CMNT-IMP: ABNORMAL
SODIUM SERPL-SCNC: 137 MMOL/L (ref 135–146)
SP GR UR STRIP: 1.01 (ref 1–1.03)
SQUAMOUS #/AREA URNS HPF: ABNORMAL /HPF
TRIGL SERPL-MCNC: 81 MG/DL
TSH SERPL-ACNC: 3.42 MIU/L (ref 0.4–4.5)
WBC # BLD AUTO: 8.6 THOUSAND/UL (ref 3.8–10.8)
WBC #/AREA URNS HPF: ABNORMAL /HPF

## 2025-05-14 ENCOUNTER — TELEPHONE (OUTPATIENT)
Dept: PRIMARY CARE | Facility: CLINIC | Age: 75
End: 2025-05-14
Payer: MEDICARE

## 2025-05-14 NOTE — TELEPHONE ENCOUNTER
----- Message from Patty John sent at 5/14/2025 12:01 PM EDT -----  Pre-diabetic range A1c.   Can improve diet by decreasing carbohydrate intake and recheck in 3-4 months. Alternatively can start medication such as metformin. If interested in medication route, please set up appointment to   discuss. If not, follow up in 3 months for repeat A1c   ----- Message -----  From: [facility] Results In  Sent: 5/7/2025  10:11 PM EDT  To: Patty John MD

## 2025-05-16 ENCOUNTER — TELEMEDICINE (OUTPATIENT)
Dept: PHARMACY | Facility: HOSPITAL | Age: 75
End: 2025-05-16
Payer: MEDICARE

## 2025-05-16 DIAGNOSIS — M81.0 OSTEOPOROSIS, UNSPECIFIED OSTEOPOROSIS TYPE, UNSPECIFIED PATHOLOGICAL FRACTURE PRESENCE: ICD-10-CM

## 2025-05-16 DIAGNOSIS — M85.80 OSTEOPENIA, UNSPECIFIED LOCATION: Primary | ICD-10-CM

## 2025-05-16 DIAGNOSIS — M85.80 OSTEOPENIA, UNSPECIFIED LOCATION: ICD-10-CM

## 2025-05-16 RX ORDER — FAMOTIDINE 10 MG/ML
20 INJECTION, SOLUTION INTRAVENOUS ONCE AS NEEDED
OUTPATIENT
Start: 2025-05-16

## 2025-05-16 RX ORDER — EPINEPHRINE 0.3 MG/.3ML
0.3 INJECTION SUBCUTANEOUS EVERY 5 MIN PRN
OUTPATIENT
Start: 2025-05-16

## 2025-05-16 RX ORDER — DIPHENHYDRAMINE HYDROCHLORIDE 50 MG/ML
50 INJECTION, SOLUTION INTRAMUSCULAR; INTRAVENOUS AS NEEDED
OUTPATIENT
Start: 2025-05-16

## 2025-05-16 RX ORDER — ALBUTEROL SULFATE 0.83 MG/ML
3 SOLUTION RESPIRATORY (INHALATION) AS NEEDED
OUTPATIENT
Start: 2025-05-16

## 2025-05-16 NOTE — PROGRESS NOTES
Clinical Pharmacy Appointment    Patient ID: Eula Ku is a 74 y.o. female who presents for Osteoprosis.    Pt is here for First appointment.     Referring Provider: Patty John MD  PCP: Patty John MD  Last visit with PCP: 5/7/25   Next visit with PCP: 8/12/25    Subjective   Medication Reconciliation:  Changed: None  Added: None  Discontinued:   Acetaminophen 325 mg  Cetirizine 10 mg   Fluzone High Dose     Drug Interactions  No relevant drug interactions were noted.    Medication System Management  Patient's preferred pharmacy: [id number] [place], [place]  Affordability/Accessibility: Prolia $775 on prescription insurance.     HPI    Patient is a 74 y.o. female presenting to an initial clinical pharmacy visit for Osteoporosis. Currently on risedronate however causing GI upset and reflux. She also supplements vitamin D3.     Previous Pharmacotherapy  Boniva 150 mg once daily - GI upset     Vitamin D: 84 ng/mL (optimal >/= 30)   Calcium: 10 mg/dL (normal 8.6-10.4)       Objective   Allergies[1]  Social History     Social History Narrative    Not on file      Medication Review  Current Outpatient Medications   Medication Instructions    acetaminophen (TYLENOL) 975 mg, oral, Every 6 hours PRN, Take alternating with Ibuprofen    amLODIPine (NORVASC) 2.5 mg, oral, Daily    atorvastatin (LIPITOR) 10 mg, oral, Daily    cetirizine (ZYRTEC) 10 mg, Daily    cholecalciferol (Vitamin D-3) 25 MCG (1000 UT) tablet 3 tablets, Daily    estrogens, conjugated, (Premarin) vaginal cream Insert/use 0.5 grams twice weekly or as directed    Fluzone High-Dose Triv 24-25 syringe 0.5 mL, Once    levothyroxine (SYNTHROID, LEVOXYL) 88 mcg, oral, Daily    lisinopril 40 mg, oral, Daily    mv-min/iron/folic/calcium/vitK (WOMEN'S MULTIVITAMIN ORAL) 1 tablet, Daily    pantoprazole (PROTONIX) 40 mg, oral, Daily before breakfast    risedronate (ACTONEL) 150 mg, oral, Every 30 days, Take in morning with full glass of water on an empty  "stomach. No food, drink, meds, or lying down for 30 minutes after.      Vitals  BP Readings from Last 2 Encounters:   05/07/25 124/84   12/04/24 110/68     BMI Readings from Last 1 Encounters:   05/07/25 26.00 kg/m²      Labs  A1C  Lab Results   Component Value Date    HGBA1C 6.1 (H) 05/07/2025    HGBA1C 5.6 05/10/2024    HGBA1C 5.6 10/24/2023     BMP  Lab Results   Component Value Date    CALCIUM 10.0 05/07/2025     05/07/2025    K 5.0 05/07/2025    CO2 27 05/07/2025     05/07/2025    BUN 14 05/07/2025    CREATININE 0.94 05/07/2025    EGFR 64 05/07/2025     LFTs  Lab Results   Component Value Date    ALT 17 05/07/2025    AST 19 05/07/2025    ALKPHOS 71 05/07/2025    BILITOT 0.5 05/07/2025     FLP  Lab Results   Component Value Date    TRIG 81 05/07/2025    CHOL 170 05/07/2025    LDLF 83 04/25/2023    LDLCALC 83 05/07/2025    HDL 70 05/07/2025     Urine Microalbumin  No results found for: \"MICROALBCREA\"  Weight Management  Wt Readings from Last 3 Encounters:   05/07/25 73 kg (161 lb)   01/27/25 71.7 kg (158 lb 1.1 oz)   12/04/24 71.7 kg (158 lb)      There is no height or weight on file to calculate BMI.     PATIENT EDUCATION/DISCUSSION:  - Counseled patient on MOA, expectations, side effects, duration of therapy, contraindications, administration, and monitoring parameters  - Answered all patient questions and concerns    Assessment/Plan   Problem List Items Addressed This Visit    None  Visit Diagnoses         Osteoporosis, unspecified osteoporosis type, unspecified pathological fracture presence            Prolia via prescription insurance $775. Ineligible for  PAP since PCP does not do injections in office. Prolia to be completed at infusion center where they will assist with cost as they bill medical insurance.   START Prolia 60 mg subcutaneous q6mo  [facility] submitted therapy plan for Prolia to be administered at    CONTINUE making healthy lifestyle choices  Provided patient with [facility] phone number " for any additional questions or concerns [phone]    Follow up with Clinical Pharmacy Team as needed by patient or PCP     Time spent with pt: Total length of time 20 (minutes) of the encounter and more than 50% was spent counseling the patient.    Continue all meds under the continuation of care with the referring provider and clinical pharmacy team.    Please reach out to the Clinical Pharmacy Team if there are any further questions.     Verbal consent to manage patient's drug therapy was obtained from patient. They were informed they may decline to participate or withdraw from participation in pharmacy services at any time.    Yesenia Tong, PharmD  Clinical Pharmacy Specialist   [phone]         [1]   Allergies  Allergen Reactions    Hydrochlorothiazide Other     hyponatremia    Iodine Unknown    Metaxalone Unknown    Shellfish Containing Products Swelling    Tetanus Vaccines And Toxoid Diarrhea and Other    Amlodipine Other     Lower extremity edema    Ibandronate GI Upset    Penicillins Hives and Rash

## 2025-05-19 ENCOUNTER — PATIENT MESSAGE (OUTPATIENT)
Dept: INFUSION THERAPY | Facility: CLINIC | Age: 75
End: 2025-05-19
Payer: MEDICARE

## 2025-05-19 ENCOUNTER — SPECIALTY PHARMACY (OUTPATIENT)
Dept: PHARMACY | Facility: CLINIC | Age: 75
End: 2025-05-19

## 2025-05-19 ENCOUNTER — DOCUMENTATION (OUTPATIENT)
Dept: INFUSION THERAPY | Facility: CLINIC | Age: 75
End: 2025-05-19
Payer: MEDICARE

## 2025-05-19 NOTE — PROGRESS NOTES
"CLINICAL CLEARANCE FOR OUTPATIENT INJECTION      Patient to be scheduled for New Start of DENOSUMAB injections.    For Diagnosis: Osteoporosis    Labs required prior to treatment (place lab orders if not already ordered or completed):Labs done 5/7/2025    Calcium drawn on:   Lab Results   Component Value Date    CALCIUM 10.0 05/07/2025    PHOS 4.3 05/10/2024      Lab Results   Component Value Date    ALBUMIN 4.6 05/07/2025     No results found for: \"CAION\"   Calcium >8.6? Yes   (Serum or Corrected Calcium must be >8.6mg/dl. OR Ionized Calcium must be >1.1 mmol/L or >4.7 mg/dL (depending on resulting agency).  If below WNL - Contact prescribing provider. Labs should be drawn within 28 days of first treatment.)    Lab Results   Component Value Date    EGFR 64 05/07/2025      (Patients with a eGFR <30 are at increased risk of hypocalcemia)      *If eGFR less than 30 reach out to prescribing provider to discuss need for frequent lab monitoring and supplementation. Recommendation is to Monitor serum calcium weekly for the first month after initiation and then at least monthly thereafter due to increased risk for hypocalcemia.    Calcium and Vitamin D supplement on medication list? Yes to Vitamin D, no to Calcium  (if no nurse to encourage discussion of supplementation at visit)    Current Outpatient Medications:     amLODIPine (Norvasc) 2.5 mg tablet, Take 1 tablet (2.5 mg) by mouth once daily. (Patient taking differently: Take 2 tablets (5 mg) by mouth once daily.), Disp: 90 tablet, Rfl: 3    atorvastatin (Lipitor) 10 mg tablet, Take 1 tablet (10 mg) by mouth once daily., Disp: 90 tablet, Rfl: 3    cholecalciferol (Vitamin D-3) 25 MCG (1000 UT) tablet, Take 3 tablets (75 mcg) by mouth once daily., Disp: , Rfl:     denosumab (Prolia) 60 mg/mL syringe, Inject 1 mL (60 mg total) under the skin every 6 months., Disp: 1 mL, Rfl: 3    estrogens, conjugated, (Premarin) vaginal cream, Insert/use 0.5 grams twice weekly or as " directed, Disp: 60 g, Rfl: 3    levothyroxine (Synthroid, Levoxyl) 88 mcg tablet, Take 1 tablet (88 mcg) by mouth once daily., Disp: 90 tablet, Rfl: 3    lisinopril 40 mg tablet, Take 1 tablet (40 mg) by mouth once daily., Disp: 90 tablet, Rfl: 3    mv-min/iron/folic/calcium/vitK (WOMEN'S MULTIVITAMIN ORAL), Take 1 tablet by mouth once daily., Disp: , Rfl:     pantoprazole (ProtoNix) 40 mg EC tablet, Take 1 tablet (40 mg) by mouth once daily in the morning. Take before meals., Disp: 90 tablet, Rfl: 3    risedronate (ActoneL) 150 mg tablet, Take 1 tablet (150 mg) by mouth every 30 (thirty) days. Take in morning with full glass of water on an empty stomach. No food, drink, meds, or lying down for 30 minutes after. (Patient not taking: Reported on 5/7/2025), Disp: 3 tablet, Rfl: 3     No obvious recent dental work per chart review. Nurse to confirm no dental within past/next 4 weeks at encounter.    Urine Hcg test ordered prior to first injection?Not applicable (If female pt <60 years of age and with reproductive capability)(If not ordered and is indicated place order)    Last injection received: N/A (if continuation)    Due: Anytime-PAR to call patient and schedule     Ok to schedule for prolia within 28 days of the calcium lab draw date listed above.

## 2025-06-11 ENCOUNTER — APPOINTMENT (OUTPATIENT)
Dept: INFUSION THERAPY | Facility: CLINIC | Age: 75
End: 2025-06-11
Payer: MEDICARE

## 2025-06-11 VITALS
TEMPERATURE: 97.7 F | OXYGEN SATURATION: 98 % | SYSTOLIC BLOOD PRESSURE: 147 MMHG | HEART RATE: 78 BPM | DIASTOLIC BLOOD PRESSURE: 67 MMHG | RESPIRATION RATE: 16 BRPM

## 2025-06-11 DIAGNOSIS — M85.80 OSTEOPENIA, UNSPECIFIED LOCATION: ICD-10-CM

## 2025-06-11 PROCEDURE — 96372 THER/PROPH/DIAG INJ SC/IM: CPT | Performed by: REGISTERED NURSE

## 2025-06-11 RX ORDER — DIPHENHYDRAMINE HYDROCHLORIDE 50 MG/ML
50 INJECTION, SOLUTION INTRAMUSCULAR; INTRAVENOUS AS NEEDED
OUTPATIENT
Start: 2025-11-13

## 2025-06-11 RX ORDER — FAMOTIDINE 10 MG/ML
20 INJECTION, SOLUTION INTRAVENOUS ONCE AS NEEDED
OUTPATIENT
Start: 2025-11-13

## 2025-06-11 RX ORDER — ALBUTEROL SULFATE 0.83 MG/ML
3 SOLUTION RESPIRATORY (INHALATION) AS NEEDED
OUTPATIENT
Start: 2025-11-13

## 2025-06-11 RX ORDER — EPINEPHRINE 0.3 MG/.3ML
0.3 INJECTION SUBCUTANEOUS EVERY 5 MIN PRN
OUTPATIENT
Start: 2025-11-13

## 2025-06-11 ASSESSMENT — ENCOUNTER SYMPTOMS
HEMATURIA: 0
FATIGUE: 0
VOICE CHANGE: 0
TROUBLE SWALLOWING: 0
NUMBNESS: 0
WHEEZING: 0
UNEXPECTED WEIGHT CHANGE: 0
NAUSEA: 0
FREQUENCY: 0
LIGHT-HEADEDNESS: 0
DYSURIA: 0
WOUND: 0
SHORTNESS OF BREATH: 0
COUGH: 0
HEADACHES: 0
BLOOD IN STOOL: 0
DIARRHEA: 0
LEG SWELLING: 0
SORE THROAT: 0
EYE PROBLEMS: 0
FEVER: 0
ABDOMINAL PAIN: 0
CONSTIPATION: 0
VOMITING: 0
PALPITATIONS: 0
DIZZINESS: 0
ARTHRALGIAS: 1
APPETITE CHANGE: 0
MYALGIAS: 0

## 2025-06-11 NOTE — PATIENT INSTRUCTIONS
Today :We administered denosumab.     For:   1. Osteopenia, unspecified location         Your next appointment is due in:  6 months Please have calcium lab level drawn 2 weeks prior to next Prolia injection appointment at the lab.         Please read the  Medication Guide that was given to you and reviewed during todays visit.     (Tell all doctors including dentists that you are taking this medication)     Go to the emergency room or call 911 if:  -You have signs of allergic reaction:   -Rash, hives, itching.   -Swollen, blistered, peeling skin.   -Swelling of face, lips, mouth, tongue or throat.   -Tightness of chest, trouble breathing, swallowing or talking     Call your doctor:  - If IV / injection site gets red, warm, swollen, itchy or leaks fluid or pus.     (Leave dressing on your IV site for at least 2 hours and keep area clean and dry  - If you get sick or have symptoms of infection or are not feeling well for any reason.    (Wash your hands often, stay away from people who are sick)  - If you have side effects from your medication that do not go away or are bothersome.     (Refer to the teaching your nurse gave you for side effects to call your doctor about)    - Common side effects may include:  stuffy nose, headache, feeling tired, muscle aches, upset stomach  - Before receiving any vaccines     - Call [facility] at   If:  - You get sick, are on antibiotics, have had a recent vaccine, have surgery or dental work and your doctor wants your visit rescheduled.  - You need to cancel and reschedule your visit for any reason. Call at least 2 days before your visit if you need to cancel.   - Your insurance changes before your next visit.    (We will need to get approval from your new insurance. This can take up to two weeks.)     The [facility] is opened Monday thru Friday. We are closed on weekends and holidays.   Voice mail will take your call if the center is closed. If  you leave a message please allow 24 hours for a call back during weekdays. If you leave a message on a weekend/holiday, we will call you back the next business day.    A pharmacist is available Monday - Friday from 8:30AM to 3:30PM to help answer any questions you may have about your prescriptions(s). Please call pharmacy at:    [facility]: [phone]  [facility]: [phone]  [facility]: [phone]

## 2025-06-11 NOTE — PROGRESS NOTES
[facility]   Infusion Clinic Note   Date: 2025   Name: Eula Ku  : 1950   MRN: [id number]         Reason for Visit: Injections (Prolia 60 mg)         Today: We administered denosumab.       Ordered By: Patty John MD       For a Diagnosis of: Osteopenia, unspecified location       At today's visit patient accompanied by: Self      Today's Vitals:   Vitals:    25 1246   BP: 147/67   Pulse: 78   Resp: 16   Temp: 36.5 °C (97.7 °F)   SpO2: 98%             Pre - Treatment Checklist:      - Previous reaction to current treatment: n/a      (Assess patient for the concerns below. Document provider notification as appropriate).  - Active or recent infection with/without current antibiotic use: no  - Recent or planned invasive dental work: no  - Recent or planned surgeries: no  - Recently received or plans to receive vaccinations: no  - Has treatment related toxicities: no  - Any chance may be pregnant:  no      Pain: 0   - Is the pain different from normal: n/a   - Is prescribing Doctor aware:  n/a      Labs: Reviewed       Fall Risk Screening: Burrell Fall Risk  History of Falling, Immediate or Within 3 Months: No  Secondary Diagnosis: No  Ambulatory Aid: Walks without aid/bedrest/nurse assist  Intravenous Therapy/Heparin Lock: No  Gait/Transferring: Normal/bedrest/immobile  Mental Status: Oriented to own ability  Burrell Fall Risk Score: 0       Review Of Systems:  Review of Systems   Constitutional:  Negative for appetite change, fatigue, fever and unexpected weight change.   HENT:   Negative for hearing loss, mouth sores, sore throat, trouble swallowing and voice change.    Eyes:  Negative for eye problems.   Respiratory:  Negative for cough, shortness of breath and wheezing.    Cardiovascular:  Negative for chest pain, leg swelling and palpitations.   Gastrointestinal:  Negative for abdominal pain, blood in stool, constipation, diarrhea, nausea and vomiting.  "  Genitourinary:  Negative for dysuria, frequency and hematuria.    Musculoskeletal:  Positive for arthralgias. Negative for myalgias.   Skin:  Negative for rash and wound.   Neurological:  Negative for dizziness, headaches, light-headedness and numbness.         Infusion Readiness:  - Assessment Concerns Related to Infusion: No  - Provider notified: no      New Patient Education:    NEW PATIENT MEDICATION EDUCATION PT PROVIDED WITH WRITTEN ([employer] PT EDUCATION SHEET) AND VERBAL EDUCATION REGARDING MEDICATION GIVEN. VERIFIED MEDICATION NAME WITH PATIENT AND DISCUSSED REASON FOR USE. BRIEFLY DISCUSSED HOW MEDICATION WORKS AND EDUCATED ON GOAL OF TREATMENT, FREQUENCY OF TREATMENT, ADVERSE RXN'S AND COMMON SIDE EFFECTS TO MONITOR FOR. INSTRUCTED PT TO ASSURE THAT ALL PROVIDERS INCLUDING DENTISTS ARE AWARE OF MEDICATION RECEIVED. DISCUSSED FLOW OF VISIT AND ORIENTED TO INFUSION CENTER. PT VERBALIZES UNDERSTANDING. CALL LIGHT PROVIDED AND PT AWARE TO ALERT STAFF OF ANY CONCERNS DURING TREATMENT.        Treatment Conditions & Drug Specific Questions:    Denosumab  (PROLIA. XGEVA. STOBOCLO)    (Unless otherwise specified on patient specific therapy plan):     TREATMENT CONDITIONS:  Unless otherwise specified on patient specific therapy plan HOLD and notify provider prior to proceeding with today's injection if patients:  O Corrected or Serum Calcium LESS THAN 8.6 mg/dL  OR Ionized calcium less than 1.1 mmol/L or  less than 4.7 mg/dL (depending on resulting agency)  o Recent or planned invasive dental procedure (within 4 weeks)    Lab Results   Component Value Date    CALCIUM 10.0 05/07/2025    PHOS 4.3 05/10/2024      No results found for: \"CAION\"    Patient meets treatment conditions? Yes    DRUG SPECIFIC QUESTIONS:  Is the patient taking calcium and vitamin D? Yes  (Recommended)    Pt Instructed on following risks: (1) hypocalcemia, (2) osteonecrosis of the jaw, (3) atypical femoral fractures, (4) serious infections, " and (5) dermatologic reactions?  Yes      REMINDER:  PREGNANCY CATEGORY X DRUG. OBTAIN NEGTATIVE PREGNANCY TEST PRIOR TO FIRST INFUSION FOR WOMEN OF CHILDBEARING ABILITY   REMS DRUG    Recommended Vitals/Observation:  Vitals: Obtain vitals prior to injection.  Observation: Patient may leave immediately following injection.        Weight Based Drug Calculations:    WEIGHT BASED DRUGS: NOT APPLICABLE / FLAT DOSE       Post Treatment: Patient tolerated treatment without issue and was discharged in no apparent distress.      Note Authored / Patient Cared for By: Lizabeth Singleton RN

## 2025-06-19 ENCOUNTER — APPOINTMENT (OUTPATIENT)
Dept: PRIMARY CARE | Facility: CLINIC | Age: 75
End: 2025-06-19
Payer: MEDICARE

## 2025-06-25 ENCOUNTER — APPOINTMENT (OUTPATIENT)
Dept: PRIMARY CARE | Facility: CLINIC | Age: 75
End: 2025-06-25
Payer: MEDICARE

## 2025-07-31 ENCOUNTER — APPOINTMENT (OUTPATIENT)
Dept: INFUSION THERAPY | Facility: CLINIC | Age: 75
End: 2025-07-31
Payer: MEDICARE

## 2025-08-12 ENCOUNTER — APPOINTMENT (OUTPATIENT)
Dept: PRIMARY CARE | Facility: CLINIC | Age: 75
End: 2025-08-12
Payer: MEDICARE

## 2025-08-12 VITALS
OXYGEN SATURATION: 94 % | HEIGHT: 66 IN | SYSTOLIC BLOOD PRESSURE: 126 MMHG | BODY MASS INDEX: 24.91 KG/M2 | WEIGHT: 155 LBS | DIASTOLIC BLOOD PRESSURE: 78 MMHG | HEART RATE: 68 BPM

## 2025-08-12 DIAGNOSIS — N95.1 MENOPAUSAL VAGINAL DRYNESS: ICD-10-CM

## 2025-08-12 DIAGNOSIS — E78.5 HYPERLIPIDEMIA, UNSPECIFIED HYPERLIPIDEMIA TYPE: ICD-10-CM

## 2025-08-12 DIAGNOSIS — R73.03 PREDIABETES: Primary | ICD-10-CM

## 2025-08-12 LAB — POC HEMOGLOBIN A1C: 6 % (ref 4.2–6.5)

## 2025-08-12 PROCEDURE — 1159F MED LIST DOCD IN RCRD: CPT | Performed by: STUDENT IN AN ORGANIZED HEALTH CARE EDUCATION/TRAINING PROGRAM

## 2025-08-12 PROCEDURE — G2211 COMPLEX E/M VISIT ADD ON: HCPCS | Performed by: STUDENT IN AN ORGANIZED HEALTH CARE EDUCATION/TRAINING PROGRAM

## 2025-08-12 PROCEDURE — 3078F DIAST BP <80 MM HG: CPT | Performed by: STUDENT IN AN ORGANIZED HEALTH CARE EDUCATION/TRAINING PROGRAM

## 2025-08-12 PROCEDURE — 1036F TOBACCO NON-USER: CPT | Performed by: STUDENT IN AN ORGANIZED HEALTH CARE EDUCATION/TRAINING PROGRAM

## 2025-08-12 PROCEDURE — 99213 OFFICE O/P EST LOW 20 MIN: CPT | Performed by: STUDENT IN AN ORGANIZED HEALTH CARE EDUCATION/TRAINING PROGRAM

## 2025-08-12 PROCEDURE — 83036 HEMOGLOBIN GLYCOSYLATED A1C: CPT | Performed by: STUDENT IN AN ORGANIZED HEALTH CARE EDUCATION/TRAINING PROGRAM

## 2025-08-12 PROCEDURE — 3074F SYST BP LT 130 MM HG: CPT | Performed by: STUDENT IN AN ORGANIZED HEALTH CARE EDUCATION/TRAINING PROGRAM

## 2025-08-12 PROCEDURE — 3008F BODY MASS INDEX DOCD: CPT | Performed by: STUDENT IN AN ORGANIZED HEALTH CARE EDUCATION/TRAINING PROGRAM

## 2025-08-12 PROCEDURE — 1160F RVW MEDS BY RX/DR IN RCRD: CPT | Performed by: STUDENT IN AN ORGANIZED HEALTH CARE EDUCATION/TRAINING PROGRAM

## 2025-08-18 ENCOUNTER — APPOINTMENT (OUTPATIENT)
Dept: OBSTETRICS AND GYNECOLOGY | Facility: CLINIC | Age: 75
End: 2025-08-18
Payer: MEDICARE

## 2025-11-11 ENCOUNTER — APPOINTMENT (OUTPATIENT)
Dept: PRIMARY CARE | Facility: CLINIC | Age: 75
End: 2025-11-11
Payer: MEDICARE

## 2025-12-08 ENCOUNTER — APPOINTMENT (OUTPATIENT)
Dept: INFUSION THERAPY | Facility: CLINIC | Age: 75
End: 2025-12-08
Payer: MEDICARE

## (undated) DEVICE — COUNTER, NEEDLE, FOAM BLOCK, POP-N-COUNT, W/BLADEGUARD, W/ADHESIVE 40 COUNT, RED

## (undated) DEVICE — SEAL, UNIVERSAL 5-8MM  XI

## (undated) DEVICE — DRAPE PACK, LAVH, W/ATTACHED LEGGINGS, W/POUCH, 100 X 114 IN, LF, STERILE

## (undated) DEVICE — TUBE SET, PNEUMOLAR HEATED, SMOKE EVACU, HIGH-FLOW

## (undated) DEVICE — PREP TRAY, VAGINAL

## (undated) DEVICE — SOLUTION, IRRIGATION, STERILE WATER, 1000 ML, POUR BOTTLE

## (undated) DEVICE — SUTURE, VICRYL, 0, 27 IN, UR-6, VIOLET

## (undated) DEVICE — SUTURE, VICRYL PLUS, 0, 27IN, UR-6, VIOLET, BRAIDED

## (undated) DEVICE — Device

## (undated) DEVICE — COUNTER, NEEDLE, FOAM BLOCK, W/MAGNET, W/BLADE GUARD, 10 COUNT, RED, LF

## (undated) DEVICE — SUTURE, MONOCRYL, 3-0, 27 IN, PS-2, UNDYED

## (undated) DEVICE — OBTURATOR, BLADELESS , SU

## (undated) DEVICE — SUTURE, VICRYL, 3-0, 27 IN, SH

## (undated) DEVICE — NEEDLE, INSUFFLATION, 13GAX120MM, DISP

## (undated) DEVICE — SOLUTION, INJECTION, USP, SODIUM CHLORIDE 0.9%, .9, NACL, 1000 ML, BAG

## (undated) DEVICE — COVER, TIP HOT SHEARS ENDOWRIST

## (undated) DEVICE — TUBING, SUCTION, NON-CONDUCTIVE, W/CONNECT,.25 IN X 12 FT, STERILE, LF

## (undated) DEVICE — STAPLER, SKIN PROXIMATE, 35 WIDE

## (undated) DEVICE — GLOVE, SURGICAL, PROTEXIS PI MICRO, 6.0, PF, LF

## (undated) DEVICE — COVER HANDLE LIGHT, STERIS, BLUE, STERILE

## (undated) DEVICE — TRAY, FOLEY, LUBRI-SIL, 16FR, COMPLETE CARE W/STATLOCK

## (undated) DEVICE — DRAPE, ARM XI

## (undated) DEVICE — ADHESIVE, SKIN, LIQUIBAND EXCEED

## (undated) DEVICE — LUBRICANT, ELECTROLUBE, F/ELECTRODE TIPS

## (undated) DEVICE — BRIEF, CURITY, XLARGE, MESH

## (undated) DEVICE — SUTURE, GORE-TEX CV-2 THX-26 36 DA"

## (undated) DEVICE — TRAY, SURESTEP, URINE METER, 16FR, COMPLETE, W/STATLOCK

## (undated) DEVICE — NEEDLE, HYPODERMIC, 25 G X 1.5 IN, A BEVEL, STERILE

## (undated) DEVICE — IRRIGATION SET, CYSTOSCOPY, TURP, Y, CONTINUOUS, 81 IN

## (undated) DEVICE — SOLUTION, IRRIGATION, SODIUM CHLORIDE 0.9%, 1000 ML, POUR BOTTLE

## (undated) DEVICE — GLOVE, SURGICAL, PROTEXIS PI BLUE W/NEUTHERA, 7.0, PF, LF

## (undated) DEVICE — CARE KIT, LAPAROSCOPIC, ADVANCED

## (undated) DEVICE — SUTURE, GORE-TEX, CV-2 THX-26 DA 36IN DA

## (undated) DEVICE — PAD, SANITARY, OBSTETRICAL, W/ADHSV STRIP,11 IN,LF

## (undated) DEVICE — RETRACTOR, SURGICAL, RING, PLASTIC, DISPOSABLE

## (undated) DEVICE — SOLUTION, SCRUB EXIDINE, 4% CHG, 4 OZ

## (undated) DEVICE — ACCESS SYS, KII SHIELDED BLADED, Z-THREAD, 12X100CM

## (undated) DEVICE — SUTURE, VICRYL PLUS 3-0, SH, 27IN

## (undated) DEVICE — PAD, GROUNDING, ELECTROSURGICAL, W/9 FT CABLE, POLYHESIVE II, ADULT, LF

## (undated) DEVICE — SYRINGE, 10 CC, LUER LOCK

## (undated) DEVICE — DRAPE, COLUMN, DAVINCI XI

## (undated) DEVICE — CAUTERY, PENCIL, PUSH BUTTON, SMOKE EVAC, 70MM

## (undated) DEVICE — SUTURE, MONOCRYL, 3-0, 27 IN, SH, UNDYED

## (undated) DEVICE — TIP, SUCTION, YANKAUER, BULB, ADULT

## (undated) DEVICE — GLOVE, SURGICAL, PROTEXIS PI MICRO, 6.5, PF, LF

## (undated) DEVICE — GLOVE, SURGICAL, PROTEXIS PI MICRO, 7.0, PF, LF

## (undated) DEVICE — TROCAR SYSTEM, BALLOON, KII GELPORT, 12 X 100MM

## (undated) DEVICE — STAY SET, SURGICAL, 5MM SHARP HOOK, 8PK

## (undated) DEVICE — SPONGE, LAP, XRAY DECT, 4IN X 18IN, W/MASTER DMT, STERILE

## (undated) DEVICE — PUMP, STRYKERFLOW 2 & HANDPIECE W/10FT. IRRIGATION TUBING

## (undated) DEVICE — SUTURE, VICRYL, 0, SH 27 TAPER NEEDLE, UNDYED, BRAIDED

## (undated) DEVICE — SOLUTION, IRRIGATION, USP, SODIUM CHLORIDE 0.9%, .9 NACL, 1000 ML, BAG

## (undated) DEVICE — PACKING, VAGINAL, 2 IN X 2 YD

## (undated) DEVICE — DRAPE, SURGICAL, OB/GYN

## (undated) DEVICE — ELECTRODE, LAPAROSCOPY, L HOOK, 33CM, STERILE